# Patient Record
Sex: MALE | Race: WHITE | NOT HISPANIC OR LATINO | Employment: FULL TIME | URBAN - METROPOLITAN AREA
[De-identification: names, ages, dates, MRNs, and addresses within clinical notes are randomized per-mention and may not be internally consistent; named-entity substitution may affect disease eponyms.]

---

## 2017-09-01 ENCOUNTER — HOSPITAL ENCOUNTER (EMERGENCY)
Facility: CLINIC | Age: 47
Discharge: HOME | End: 2017-09-01
Attending: PHYSICIAN ASSISTANT | Admitting: PHYSICIAN ASSISTANT
Payer: OTHER MISCELLANEOUS

## 2017-09-01 ENCOUNTER — APPOINTMENT (OUTPATIENT)
Dept: GENERAL RADIOLOGY | Facility: CLINIC | Age: 47
End: 2017-09-01
Attending: PHYSICIAN ASSISTANT
Payer: OTHER MISCELLANEOUS

## 2017-09-01 VITALS
HEIGHT: 66 IN | BODY MASS INDEX: 37.77 KG/M2 | DIASTOLIC BLOOD PRESSURE: 96 MMHG | HEART RATE: 64 BPM | SYSTOLIC BLOOD PRESSURE: 121 MMHG | RESPIRATION RATE: 16 BRPM | OXYGEN SATURATION: 96 % | TEMPERATURE: 98.4 F | WEIGHT: 235 LBS

## 2017-09-01 DIAGNOSIS — S29.9XXA CHEST WALL INJURY, INITIAL ENCOUNTER: ICD-10-CM

## 2017-09-01 DIAGNOSIS — S70.02XA CONTUSION OF LEFT HIP, INITIAL ENCOUNTER: ICD-10-CM
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
## 2017-09-01 DIAGNOSIS — S39.012A BACK STRAIN, INITIAL ENCOUNTER: ICD-10-CM

## 2017-09-01 LAB
ALBUMIN UR-MCNC: NEGATIVE MG/DL
APPEARANCE UR: CLEAR
BILIRUB UR QL STRIP: NEGATIVE
COLOR UR AUTO: YELLOW
GLUCOSE UR STRIP-MCNC: NEGATIVE MG/DL
HGB UR QL STRIP: ABNORMAL
KETONES UR STRIP-MCNC: ABNORMAL MG/DL
LEUKOCYTE ESTERASE UR QL STRIP: NEGATIVE
MUCOUS THREADS #/AREA URNS LPF: PRESENT /LPF
NITRATE UR QL: NEGATIVE
PH UR STRIP: 5.5 PH (ref 5–7)
RBC #/AREA URNS AUTO: ABNORMAL /HPF
SOURCE: ABNORMAL
SP GR UR STRIP: 1.02 (ref 1–1.03)
UROBILINOGEN UR STRIP-ACNC: 1 EU/DL (ref 0.2–1)
WBC #/AREA URNS AUTO: ABNORMAL /HPF

## 2017-09-01 PROCEDURE — 99285 EMERGENCY DEPT VISIT HI MDM: CPT

## 2017-09-01 PROCEDURE — 71101 X-RAY EXAM UNILAT RIBS/CHEST: CPT | Mod: LT

## 2017-09-01 PROCEDURE — 25000132 ZZH RX MED GY IP 250 OP 250 PS 637: Performed by: PHYSICIAN ASSISTANT

## 2017-09-01 PROCEDURE — 81001 URINALYSIS AUTO W/SCOPE: CPT | Performed by: PHYSICIAN ASSISTANT

## 2017-09-01 PROCEDURE — 73502 X-RAY EXAM HIP UNI 2-3 VIEWS: CPT

## 2017-09-01 RX ORDER — OXYCODONE AND ACETAMINOPHEN 5; 325 MG/1; MG/1
2 TABLET ORAL ONCE
Status: COMPLETED | OUTPATIENT
Start: 2017-09-01 | End: 2017-09-01

## 2017-09-01 RX ORDER — TIZANIDINE 2 MG/1
1-2 TABLET ORAL 3 TIMES DAILY
Qty: 10 TABLET | Refills: 0 | Status: SHIPPED | OUTPATIENT
Start: 2017-09-01 | End: 2017-09-05

## 2017-09-01 RX ORDER — OXYCODONE AND ACETAMINOPHEN 5; 325 MG/1; MG/1
1-2 TABLET ORAL EVERY 4 HOURS PRN
Qty: 15 TABLET | Refills: 0 | Status: SHIPPED | OUTPATIENT
Start: 2017-09-01 | End: 2017-09-05

## 2017-09-01 RX ADMIN — OXYCODONE HYDROCHLORIDE AND ACETAMINOPHEN 2 TABLET: 5; 325 TABLET ORAL at 21:34

## 2017-09-01 ASSESSMENT — ENCOUNTER SYMPTOMS
VOMITING: 0
HEADACHES: 0
NUMBNESS: 0
BACK PAIN: 1
NAUSEA: 0

## 2017-09-01 NOTE — ED AVS SNAPSHOT
Emergency Department    [address] [facility] [address]    Phone:  [phone]    Fax:  [phone]                                       Ry Escobar   MRN: [id number]    Department:   Emergency Department   Date of Visit:  9/1/2017           Patient Information     Date Of Birth          1970        Your diagnoses for this visit were:     Chest wall injury, initial encounter     Back strain, initial encounter     Contusion of left hip, initial encounter     Fall, initial encounter        You were seen by Brooke Farr PA-C.      Follow-up Information     Follow up with [facility] In 3 days.    Specialties:  Podiatry, Internal Medicine, Family Medicine    Contact information:    [address] [address] [phone]        Follow up with  Emergency Department.    Specialty:  EMERGENCY MEDICINE    Why:  If symptoms worsen    Contact information:    [address] [facility] [id number] [phone]        Discharge Instructions       Discharge Instructions  Chest Injury    You have been seen today because of a chest injury.  You may have contusion (bruise) of the chest or a rib fracture (break).  Rib fractures can be hard to see on x-ray, so we can t always be sure whether your rib is broken or bruised. Fortunately, the treatment of these injuries is usually the same, and includes pain control and preventing complications.    Return to the Emergency Department if:    You become short of breath.    You develop a fever over 101.5 degrees.    You pass out or become very weak or pale.    You have abdominal pain that is new or increasing.    You cough up blood.    You have new symptoms or anything that worries you.    Follow-up with your doctor:    As directed by your physician today.    If you are not improved in two weeks.    If you need more pain medicine, since we don t refill pain pills through the Emergency  Department.    Home care instructions:    Chest injuries can be painful.  You may take a pain medication such as Tylenol  (acetaminophen), Advil  (ibuprofen), Nuprin  (ibuprofen) or Aleve  (naproxen).  If you have been given a narcotic such as Vicodin  (hydrocodone with acetaminophen), Percocet  (oxycodone with acetaminophen), or codeine, do not drive for four hours after you have taken it. If the narcotic contains Tylenol  (acetaminophen), do not take Tylenol  with it. All narcotics will cause constipation, so eat a high fiber diet.      Applying ice packs to the painful area can help your pain.     Holding a pillow against your chest can help with pain when you need to move or cough.    You may need to rest and avoid lifting particularly in the first few days after your injury.    Prevention of pneumonia (lung infection) is also a part of managing chest injuries.  Because it can hurt to take deep breaths, you could develop collapsed areas of lung that can develop infection.  To prevent this, you need to take ten very deep breaths every hour while you are awake. Sometimes you will be given a device called an incentive spirometer to help with this. You also need to make yourself cough every hour.    Rib belts or binders are not generally recommended, since they may increase the risk of pneumonia. If you do use one, use it for only short periods of time.   If you were given a prescription for medicine here today, be sure to read all of the information (including the package insert) that comes with your prescription.  This will include important information about the medicine, its side effects, and any warnings that you need to know about.  The pharmacist who fills the prescription can provide more information and answer questions you may have about the medicine.  If you have questions or concerns that the pharmacist cannot address, please call or return to the Emergency Department.       Discharge Instructions  Back  Pain  You were seen today for back pain. Back pain can have many causes, but most will get better without surgery or other specific treatment. Sometimes there is a herniated ( slipped ) disc. We don t usually do MRI scans to look for these right away, since most herniated discs will get better on their own with time.  Today, we did not find any evidence that your back pain was caused by a serious condition, such as an infection, fracture, or tumor. However, sometimes symptoms develop over time and cannot be found during an emergency visit, so it is very important that you follow up with your primary doctor.  Return to the Emergency Department if:    You develop a fever with your back pain.     You have weakness or change in sensation in one or both legs.    You lose control of your bowels or bladder, or can t empty your bladder.    Your pain gets much worse.     Follow-up with your doctor:    Unless your pain has completely gone away, please make an appointment with your doctor within one week.  You may need further management of your back pain, such as more pain medication, imaging such as an X-ray or MRI, or physical therapy.    What can I do to help myself?    Remain Active -- People are often afraid that they will hurt their back further or delay recovery by remaining active, but this is one of the best things you can do for your back. In fact, prolonged bed rest is not recommended. Studies have shown that people with low back pain recover faster when they remain active. Movement helps to bring blood flow to the muscles and relieve muscle spasms as well as preventing loss of muscle strength.    Heat -- Using a heating pad can help with low back pain during the first few weeks. Do not sleep with a heating pad, as you can be burned.     Pain medications - You may take a pain medication such as Tylenol  (acetaminophen), Advil , Nuprin  (ibuprofen) or Aleve  (naproxen).  If you have been given a narcotic such as  Vicodin  (hydrocodone with acetaminophen), Percocet  (oxycodone with acetaminophen), codeine, or a muscle relaxant such as Flexeril  (cyclobenzaprine) or Soma  (carisoprodol), do not drive for four hours after you have taken it. If the narcotic contains Tylenol  (acetaminophen), do not take Tylenol  with it. All narcotics will cause constipation, so eat a high fiber diet.   If you were given a prescription for medicine here today, be sure to read all of the information (including the package insert) that comes with your prescription.  This will include important information about the medicine, its side effects, and any warnings that you need to know about.  The pharmacist who fills the prescription can provide more information and answer questions you may have about the medicine.  If you have questions or concerns that the pharmacist cannot address, please call or return to the Emergency Department.   Opioid Medication Information    Pain medications are among the most commonly prescribed medicines, so we are including this information for all our patients. If you did not receive pain medication or get a prescription for pain medicine, you can ignore it.     You may have been given a prescription for an opioid (narcotic) pain medicine and/or have received a pain medicine while here in the Emergency Department. These medicines can make you drowsy or impaired. You must not drive, operate dangerous equipment, or engage in any other dangerous activities while taking these medications. If you drive while taking these medications, you could be arrested for DUI, or driving under the influence. Do not drink any alcohol while you are taking these medications.     Opioid pain medications can cause addiction. If you have a history of chemical dependency of any type, you are at a higher risk of becoming addicted to pain medications.  Only take these prescribed medications to treat your pain when all other options have been  tried. Take it for as short a time and as few doses as possible. Store your pain pills in a secure place, as they are frequently stolen and provide a dangerous opportunity for children or visitors in your house to start abusing these powerful medications. We will not replace any lost or stolen medicine.  As soon as your pain is better, you should flush all your remaining medication.     Many prescription pain medications contain Tylenol  (acetaminophen), including Vicodin , Tylenol #3 , Norco , Lortab , and Percocet .  You should not take any extra pills of Tylenol  if you are using these prescription medications or you can get very sick.  Do not ever take more than 3000 mg of acetaminophen in any 24 hour period.    All opioids tend to cause constipation. Drink plenty of water and eat foods that have a lot of fiber, such as fruits, vegetables, prune juice, apple juice and high fiber cereal.  Take a laxative if you don t move your bowels at least every other day. Miralax , Milk of Magnesia, Colace , or Senna  can be used to keep you regular.      Remember that you can always come back to the Emergency Department if you are not able to see your regular doctor in the amount of time listed above, if you get any new symptoms, or if there is anything that worries you.        24 Hour Appointment Hotline       To make an appointment at any [facility] clinic, call [phone] ([phone]). If you don't have a family doctor or clinic, we will help you find one. [facility] clinics are conveniently located to serve the needs of you and your family.             Review of your medicines      START taking        Dose / Directions Last dose taken    oxyCODONE-acetaminophen 5-325 MG per tablet   Commonly known as:  PERCOCET   Dose:  1-2 tablet   Quantity:  15 tablet        Take 1-2 tablets by mouth every 4 hours as needed for pain   Refills:  0        tiZANidine 2 MG tablet   Commonly known as:  ZANAFLEX   Dose:  1-2 mg    Quantity:  10 tablet        Take 0.5-1 tablets (1-2 mg) by mouth 3 times daily   Refills:  0          Our records show that you are taking the medicines listed below. If these are incorrect, please call your family doctor or clinic.        Dose / Directions Last dose taken    ibuprofen 600 MG tablet   Commonly known as:  ADVIL/MOTRIN   Dose:  600 mg   Quantity:  50 tablet        Take 1 tablet (600 mg) by mouth every 6 hours as needed for other (For mild pain or temperature greater than 102F)   Refills:  0        PRILOSEC PO        Refills:  0                Prescriptions were sent or printed at these locations (2 Prescriptions)                   Other Prescriptions                Printed at Department/Unit printer (2 of 2)         oxyCODONE-acetaminophen (PERCOCET) 5-325 MG per tablet               tiZANidine (ZANAFLEX) 2 MG tablet                Procedures and tests performed during your visit     *UA reflex to Microscopic    Ribs XR, unilat 3 views + PA chest,  left    Urine Microscopic    XR Pelvis w Hip Left 1 View      Orders Needing Specimen Collection     None      Pending Results     No orders found from 8/30/2017 to 9/2/2017.            Pending Culture Results     No orders found from 8/30/2017 to 9/2/2017.            Pending Results Instructions     If you had any lab results that were not finalized at the time of your Discharge, you can call the ED Lab Result RN at [phone]. You will be contacted by this team for any positive Lab results or changes in treatment. The nurses are available 7 days a week from 10A to 6:30P.  You can leave a message 24 hours per day and they will return your call.        Test Results From Your Hospital Stay        9/1/2017 10:14 PM      Narrative     RIBS UNILATERAL THREE VIEWS LEFT  9/1/2017 10:05 PM    HISTORY: Posterior rib pain after fall.    COMPARISON: None.    FINDINGS: Oblique views of the left hemithorax demonstrate no rib  fractures or pneumothorax. There are no  acute infiltrates. The cardiac  silhouette is not enlarged. Pulmonary vasculature is unremarkable.        Impression     IMPRESSION: No rib fracture demonstrated.    MADI RUBIO MD         9/1/2017 10:14 PM      Narrative     PELVIS WITH UNILATERAL HIP ONE VIEW LEFT  9/1/2017 10:04 PM     HISTORY: Left hip and posterior pelvic pain after a fall.    COMPARISON: None.    FINDINGS: Mild bilateral acetabular sclerosis and loss of joint space.  There is no acute fracture or dislocation. There are no worrisome bony  lesions.        Impression     IMPRESSION: No acute osseous abnormality demonstrated.    MADI RUBIO MD         9/1/2017 10:10 PM      Component Results     Component Value Ref Range & Units Status    Color Urine Yellow  Final    Appearance Urine Clear  Final    Glucose Urine Negative NEG^Negative mg/dL Final    Bilirubin Urine Negative NEG^Negative Final    Ketones Urine Trace (A) NEG^Negative mg/dL Final    Specific Gravity Urine 1.025 1.003 - 1.035 Final    Blood Urine Small (A) NEG^Negative Final    pH Urine 5.5 5.0 - 7.0 pH Final    Protein Albumin Urine Negative NEG^Negative mg/dL Final    Urobilinogen Urine 1.0 0.2 - 1.0 EU/dL Final    Nitrite Urine Negative NEG^Negative Final    Leukocyte Esterase Urine Negative NEG^Negative Final    Source Midstream Urine  Final         9/1/2017 10:10 PM      Component Results     Component Value Ref Range & Units Status    WBC Urine O - 2 OTO2^O - 2 /HPF Final    RBC Urine O - 2 OTO2^O - 2 /HPF Final    Mucous Urine Present (A) NEG^Negative /LPF Final                Clinical Quality Measure: Blood Pressure Screening     Your blood pressure was checked while you were in the emergency department today. The last reading we obtained was  BP: (!) 121/96 . Please read the guidelines below about what these numbers mean and what you should do about them.  If your systolic blood pressure (the top number) is less than 120 and your diastolic blood pressure (the bottom  "number) is less than 80, then your blood pressure is normal. There is nothing more that you need to do about it.  If your systolic blood pressure (the top number) is 120-139 or your diastolic blood pressure (the bottom number) is 80-89, your blood pressure may be higher than it should be. You should have your blood pressure rechecked within a year by a primary care provider.  If your systolic blood pressure (the top number) is 140 or greater or your diastolic blood pressure (the bottom number) is 90 or greater, you may have high blood pressure. High blood pressure is treatable, but if left untreated over time it can put you at risk for heart attack, stroke, or kidney failure. You should have your blood pressure rechecked by a primary care provider within the next 4 weeks.  If your provider in the emergency department today gave you specific instructions to follow-up with your doctor or provider even sooner than that, you should follow that instruction and not wait for up to 4 weeks for your follow-up visit.        Thank you for choosing [facility]       Thank you for choosing [facility] for your care. Our goal is always to provide you with excellent care. Hearing back from our patients is one way we can continue to improve our services. Please take a few minutes to complete the written survey that you may receive in the mail after you visit with us. Thank you!        Potential Information     Potential lets you send messages to your doctor, view your test results, renew your prescriptions, schedule appointments and more. To sign up, go to [email or web address] . Click on \"Log in\" on the left side of the screen, which will take you to the Welcome page. Then click on \"Sign up Now\" on the right side of the page.     You will be asked to enter the access code listed below, as well as some personal information. Please follow the directions to create your username and password.     Your access code is: [email or web address]  Expires: " 2017 10:44 PM     Your access code will  in 90 days. If you need help or a new code, please call your [facility] clinic or [phone].        Care EveryWhere ID     This is your Care EveryWhere ID. This could be used by other organizations to access your [facility] medical records  [id number]        Equal Access to Services     LUDY MARIE : Marga Virgen, wacorrine porter, sheila meadaldanilo carranza, cosmo merino . [facility] [phone].    ATENCIÓN: Si habla español, tiene a llamas disposición servicios gratuitos de asistencia lingüística. Llame al [phone].    We comply with applicable federal civil rights laws and [place] laws. We do not discriminate on the basis of race, color, national origin, age, disability sex, sexual orientation or gender identity.            After Visit Summary       This is your record. Keep this with you and show to your community pharmacist(s) and doctor(s) at your next visit.

## 2017-09-01 NOTE — ED AVS SNAPSHOT
Emergency Department    [address] [place] [address]    Phone:  [phone]    Fax:  [phone]                                       Ry Escobar   MRN: [id number]    Department:   Emergency Department   Date of Visit:  9/1/2017           After Visit Summary Signature Page     I have received my discharge instructions, and my questions have been answered. I have discussed any challenges I see with this plan with the nurse or doctor.    ..........................................................................................................................................  Patient/Patient Representative Signature      ..........................................................................................................................................  Patient Representative Print Name and Relationship to Patient    ..................................................               ................................................  Date                                            Time    ..........................................................................................................................................  Reviewed by Signature/Title    ...................................................              ..............................................  Date                                                            Time

## 2017-09-02 ASSESSMENT — ENCOUNTER SYMPTOMS
ABDOMINAL PAIN: 0
COUGH: 0
SHORTNESS OF BREATH: 0
WOUND: 0
WEAKNESS: 0
NECK PAIN: 0

## 2017-09-02 NOTE — ED PROVIDER NOTES
"  History     Chief Complaint:  Fall    HPI   Ry Escobar is a 47 year old male who presents after a fall. Just prior to arrival the patient was at work on a machine, when he tripped and fell 2-3 feet onto a metal rung on another part of the machine. He did not hit his head or lose consciousness. He was also wearing his hard hat. Since the fall he has had left posterior rib pain, left lower back pain and left hip pain. The pains worsen with movement and the chest wall pain worsens with deep breathing. He has been ambulatory since. He has not taken any pain medication. Patient denies headache, nausea and vomiting, numbness and tingling in the lower extremities, bowel or bladder incontinence, hematuria, abdominal pain, saddle anesthesia, any other areas of pain or injury, or any other symptoms or concerns.     Allergies:  Robitussin     Medications:    Prilosec  Advil     Past Medical History:    Closed lumbar fracture  GERD  Kidney stone    Past Surgical History:    History reviewed. No pertinent surgical history.    Family History:    History reviewed. No pertinent family history.    Social History:  Marital Status:   Presents to the ED with a friend.   Tobacco Use: Never  Alcohol Use: Rarely  PCP: Physician No Ref-Primary     Review of Systems   Respiratory: Negative for cough and shortness of breath.    Cardiovascular: Positive for chest pain.   Gastrointestinal: Negative for abdominal pain, nausea and vomiting.   Musculoskeletal: Positive for back pain. Negative for neck pain.        + left hip pain  - other areas of pain   Skin: Negative for wound.   Neurological: Negative for syncope, weakness, numbness and headaches.   All other systems reviewed and are negative.      Physical Exam   First Vitals:  BP: 133/67  Pulse: 59  Temp: 98.4  F (36.9  C)  Resp: 16  Height: 167.6 cm (5' 6\")  Weight: 106.6 kg (235 lb)  SpO2: 96 %      Physical Exam  General: Resting comfortably on the gurney.    Head:  The scalp, " head and face appear normal. No evidence of trauma.     No scalp hematoma or step-offs.     No racoon eyes or battel signs.   ENT:  Pupils are equal, round and reactive to light. EOM intact. No nystagmus.    Oropharynx is moist.    Neck:  Supple, no rigidity noted. Normal ROM.     Trachea midline. No mass detected.      No cervical midline or paraspinal muscle tenderness. No step-offs.   Resp:  Non-labored breathing. No tachypnea.     Lung fields clear to auscultation without wheezes or rales.   CV:  Regular rate and rhythm. Normal S1 and S2, no S3 or S4.     No pathological murmur detected.     Radial and PT pulses intact and symmetric.   GI:  Abdomen is soft and non-distended.     Non-tender to palpation in all four quadrants.      No CVA tenderness bilaterally.   MS:  Normal muscular tone.     5/5 and symmetric strength with dorsi- and plantar-flexion, , elbow flexion and extension.     Normal and symmetric ROM with dorsi- and plantar-flexion, knee flexion and extension, elbow flexion and extension.     Left lower, posterior, rib cage tenderness with palpation, no step-offs or crepitus. The remainder of the chest wall is non-tender to palpation.      Left mid to lower lumbar paraspinal muscle tenderness and tightness with palpation. No right sided or midline tenderness. No step-offs.     No thoracic midline or paraspinal muscle tenderness with palpation. No step-offs.     Left posterior and lateral hip as well as pelvic pain with palpation with posterior-lateral faint ecchymosis. The remainder of the left lower extremity is non-tender with palpation. Able to range the hip with only mild pain.     Pelvis stable to compression.   Neuro:  GCS 15.     Awake and alert. Obeys commands appropriately.     Speech is clear.     Patellar and achilles reflexes 2+ and symmetric.     Sensation intact to light touch upper and lower extremities.   Skin:  As in MS, otherwise no rash, ecchymosis, abrasions or lacerations.    Psych: Normal affect. Appropriate interactions.      Emergency Department Course     Imaging:  Left Ribs XR, per radiology:   IMPRESSION: No rib fracture demonstrated.    Pelvis with left Hip XR, per radiology:   IMPRESSION: No acute osseous abnormality demonstrated.    Radiographic findings were communicated with the patient who voiced understanding of the findings.    Laboratory:  UA: Clear yellow urine, trace ketones, small blood, otherwise WNL  UA, Microscopic: WBC 0-2, RBC 0-2, mucous present     Interventions:  2134: Percocet, 2 tablets, oral    Emergency Department Course:  Nursing notes and vitals reviewed.  I performed an exam of the patient as documented above.  The above workup was undertaken.  2224: I rechecked the patient and discussed results.  Findings and plan explained to the Patient. Patient discharged home, status improved, with instructions regarding supportive care, medications, and reasons to return as well as the importance of close follow-up was reviewed. Patient was prescribed Percocet and Zanaflex.     Impression & Plan      Medical Decision Making:  Mr. Escobar is a 47 year old male who presents to the ED for evaluation after a fall this evening. He fell on to a metal bar on to his left posterior chest wall back and hip. He did not hit his head or lose consciousness.     Regarding the chest wall, concern was for rib fracture, pneumothorax, among others. XR is negative for signs of fracture, pneumothorax or any acute abnormality.  The sensitivity for rib fracture on chest xray was discussed with patient and if symptoms continue or progress may need CT of chest; I do not feel with the patients risk/benefit ratio and clinical symptoms this is required at this time.    We discussed making sure he is taking deep breaths as he is at increased risk of pneumonia with shallow breathing and following up with PCP closely.     Regarding the back pain, there are no signs of cord compression or vascular  compromise.  No midline tenderness, therefore x-rays are not necessary due to the low likelihood of fracture or subluxation. He  has not had a fever, saddle/perineal anesthesia, bilateral foot numbness, or bowel or bladder dysfunction.  There is no clinical evidence of cauda equina syndrome, discitis, spinal/epidural space hematoma or abscess. The neurological exam is normal and his symptoms are consistent with a musculoskeletal strain. There is no current evidence of radiculopathy or myelopathy. The patient will be discharged with pain medications to use as directed.      He also presents with hip contusion. XRs are negative for fracture, dislocation or any other acute abnormality. No signs of nerve impingement or vascular compromise. He has no numbness or tingling. CMS is intact distally. Recommended ice to the area.     He was given Percocet here with good relief of symptoms. He does have significant muscle tightness in his back and for that he was also given tizanidine since he has had a bad reaction to Flexeril in the past, but has tolerated tizanidine. Narcotic precautions were given with Percocet. No other areas of pain or injury. Given the back and hip location of the pain, urine analysis was obtained showing 0-2 RBCs, no significant hematuria to show kidney injury. Additionally he has no pain with multiple abdominal and CVA exams and no rigidity. I do not believe he needs any imaging or further evaluation for intraabdominal process at this time. He will follow up with primary care in 3 days for recheck or return to ED for worsening or new symptoms. I discussed the results, plan and any additional questions with the patient. He verbalized understanding and agreement with the plan.          Diagnosis:    ICD-10-CM    1. Chest wall injury, initial encounter S29.9XXA    2. Back strain, initial encounter S39.012A    3. Contusion of left hip, initial encounter S70.02XA    4. Fall, initial encounter W19.XXXA         Disposition:  Discharged to home.     Discharge Medications:  New Prescriptions    OXYCODONE-ACETAMINOPHEN (PERCOCET) 5-325 MG PER TABLET    Take 1-2 tablets by mouth every 4 hours as needed for pain    TIZANIDINE (ZANAFLEX) 2 MG TABLET    Take 0.5-1 tablets (1-2 mg) by mouth 3 times daily         Jolly DEL RIO, am serving as a scribe on 9/1/2017 at 9:22 PM to personally document services performed by Brooke Farr PA-C, based on my observations and the provider's statements to me.    EMERGENCY DEPARTMENT       Brooke Farr PA-C  09/02/17 1927

## 2017-09-02 NOTE — DISCHARGE INSTRUCTIONS
Discharge Instructions  Chest Injury    You have been seen today because of a chest injury.  You may have contusion (bruise) of the chest or a rib fracture (break).  Rib fractures can be hard to see on x-ray, so we can t always be sure whether your rib is broken or bruised. Fortunately, the treatment of these injuries is usually the same, and includes pain control and preventing complications.    Return to the Emergency Department if:    You become short of breath.    You develop a fever over 101.5 degrees.    You pass out or become very weak or pale.    You have abdominal pain that is new or increasing.    You cough up blood.    You have new symptoms or anything that worries you.    Follow-up with your doctor:    As directed by your physician today.    If you are not improved in two weeks.    If you need more pain medicine, since we don t refill pain pills through the Emergency Department.    Home care instructions:    Chest injuries can be painful.  You may take a pain medication such as Tylenol  (acetaminophen), Advil  (ibuprofen), Nuprin  (ibuprofen) or Aleve  (naproxen).  If you have been given a narcotic such as Vicodin  (hydrocodone with acetaminophen), Percocet  (oxycodone with acetaminophen), or codeine, do not drive for four hours after you have taken it. If the narcotic contains Tylenol  (acetaminophen), do not take Tylenol  with it. All narcotics will cause constipation, so eat a high fiber diet.      Applying ice packs to the painful area can help your pain.     Holding a pillow against your chest can help with pain when you need to move or cough.    You may need to rest and avoid lifting particularly in the first few days after your injury.    Prevention of pneumonia (lung infection) is also a part of managing chest injuries.  Because it can hurt to take deep breaths, you could develop collapsed areas of lung that can develop infection.  To prevent this, you need to take ten very deep breaths every  hour while you are awake. Sometimes you will be given a device called an incentive spirometer to help with this. You also need to make yourself cough every hour.    Rib belts or binders are not generally recommended, since they may increase the risk of pneumonia. If you do use one, use it for only short periods of time.   If you were given a prescription for medicine here today, be sure to read all of the information (including the package insert) that comes with your prescription.  This will include important information about the medicine, its side effects, and any warnings that you need to know about.  The pharmacist who fills the prescription can provide more information and answer questions you may have about the medicine.  If you have questions or concerns that the pharmacist cannot address, please call or return to the Emergency Department.       Discharge Instructions  Back Pain  You were seen today for back pain. Back pain can have many causes, but most will get better without surgery or other specific treatment. Sometimes there is a herniated ( slipped ) disc. We don t usually do MRI scans to look for these right away, since most herniated discs will get better on their own with time.  Today, we did not find any evidence that your back pain was caused by a serious condition, such as an infection, fracture, or tumor. However, sometimes symptoms develop over time and cannot be found during an emergency visit, so it is very important that you follow up with your primary doctor.  Return to the Emergency Department if:    You develop a fever with your back pain.     You have weakness or change in sensation in one or both legs.    You lose control of your bowels or bladder, or can t empty your bladder.    Your pain gets much worse.     Follow-up with your doctor:    Unless your pain has completely gone away, please make an appointment with your doctor within one week.  You may need further management of your  back pain, such as more pain medication, imaging such as an X-ray or MRI, or physical therapy.    What can I do to help myself?    Remain Active -- People are often afraid that they will hurt their back further or delay recovery by remaining active, but this is one of the best things you can do for your back. In fact, prolonged bed rest is not recommended. Studies have shown that people with low back pain recover faster when they remain active. Movement helps to bring blood flow to the muscles and relieve muscle spasms as well as preventing loss of muscle strength.    Heat -- Using a heating pad can help with low back pain during the first few weeks. Do not sleep with a heating pad, as you can be burned.     Pain medications - You may take a pain medication such as Tylenol  (acetaminophen), Advil , Nuprin  (ibuprofen) or Aleve  (naproxen).  If you have been given a narcotic such as Vicodin  (hydrocodone with acetaminophen), Percocet  (oxycodone with acetaminophen), codeine, or a muscle relaxant such as Flexeril  (cyclobenzaprine) or Soma  (carisoprodol), do not drive for four hours after you have taken it. If the narcotic contains Tylenol  (acetaminophen), do not take Tylenol  with it. All narcotics will cause constipation, so eat a high fiber diet.   If you were given a prescription for medicine here today, be sure to read all of the information (including the package insert) that comes with your prescription.  This will include important information about the medicine, its side effects, and any warnings that you need to know about.  The pharmacist who fills the prescription can provide more information and answer questions you may have about the medicine.  If you have questions or concerns that the pharmacist cannot address, please call or return to the Emergency Department.   Opioid Medication Information    Pain medications are among the most commonly prescribed medicines, so we are including this information  for all our patients. If you did not receive pain medication or get a prescription for pain medicine, you can ignore it.     You may have been given a prescription for an opioid (narcotic) pain medicine and/or have received a pain medicine while here in the Emergency Department. These medicines can make you drowsy or impaired. You must not drive, operate dangerous equipment, or engage in any other dangerous activities while taking these medications. If you drive while taking these medications, you could be arrested for DUI, or driving under the influence. Do not drink any alcohol while you are taking these medications.     Opioid pain medications can cause addiction. If you have a history of chemical dependency of any type, you are at a higher risk of becoming addicted to pain medications.  Only take these prescribed medications to treat your pain when all other options have been tried. Take it for as short a time and as few doses as possible. Store your pain pills in a secure place, as they are frequently stolen and provide a dangerous opportunity for children or visitors in your house to start abusing these powerful medications. We will not replace any lost or stolen medicine.  As soon as your pain is better, you should flush all your remaining medication.     Many prescription pain medications contain Tylenol  (acetaminophen), including Vicodin , Tylenol #3 , Norco , Lortab , and Percocet .  You should not take any extra pills of Tylenol  if you are using these prescription medications or you can get very sick.  Do not ever take more than 3000 mg of acetaminophen in any 24 hour period.    All opioids tend to cause constipation. Drink plenty of water and eat foods that have a lot of fiber, such as fruits, vegetables, prune juice, apple juice and high fiber cereal.  Take a laxative if you don t move your bowels at least every other day. Miralax , Milk of Magnesia, Colace , or Senna  can be used to keep you regular.       Remember that you can always come back to the Emergency Department if you are not able to see your regular doctor in the amount of time listed above, if you get any new symptoms, or if there is anything that worries you.

## 2017-09-05 ENCOUNTER — OFFICE VISIT (OUTPATIENT)
Dept: FAMILY MEDICINE | Facility: CLINIC | Age: 47
End: 2017-09-05
Payer: OTHER MISCELLANEOUS

## 2017-09-05 VITALS
SYSTOLIC BLOOD PRESSURE: 126 MMHG | TEMPERATURE: 97.7 F | HEART RATE: 57 BPM | OXYGEN SATURATION: 97 % | HEIGHT: 66 IN | WEIGHT: 238 LBS | RESPIRATION RATE: 14 BRPM | DIASTOLIC BLOOD PRESSURE: 81 MMHG | BODY MASS INDEX: 38.25 KG/M2

## 2017-09-05 DIAGNOSIS — S20.212D: Primary | ICD-10-CM

## 2017-09-05 PROCEDURE — 99213 OFFICE O/P EST LOW 20 MIN: CPT | Performed by: FAMILY MEDICINE

## 2017-09-05 RX ORDER — TIZANIDINE 2 MG/1
1-2 TABLET ORAL 3 TIMES DAILY
Qty: 30 TABLET | Refills: 0 | Status: SHIPPED | OUTPATIENT
Start: 2017-09-05 | End: 2017-09-27

## 2017-09-05 RX ORDER — OXYCODONE AND ACETAMINOPHEN 5; 325 MG/1; MG/1
1-2 TABLET ORAL EVERY 4 HOURS PRN
Qty: 30 TABLET | Refills: 0 | Status: SHIPPED | OUTPATIENT
Start: 2017-09-05 | End: 2018-10-11

## 2017-09-05 NOTE — LETTER
[facility]  [address] [address] [address]  [phone]          September 5, 2017    Ry Escobar                                                                                                                     [address] [facility] [address]        To Whom It May Concern:    Please excuse Ry Escobar from work today 9/5/17 until 9/9/17 due to multiple rib and back contusions. He has a follow up appointment on 9/9/17 and further restrictions will be advised at the appointment.       Sincerely,         Gelacio Dumas MD

## 2017-09-05 NOTE — MR AVS SNAPSHOT
"              After Visit Summary   9/5/2017    Ry Escobar    MRN: [id number]           Patient Information     Date Of Birth          1970        Visit Information        Provider Department      9/5/2017 3:00 PM Gelacio Dumas MD [facility]        Today's Diagnoses     Bruised ribs, left, subsequent encounter    -  1       Follow-ups after your visit        Your next 10 appointments already scheduled     Sep 08, 2017  3:30 PM CDT   Office Visit with Gelacio Dumas MD   [facility] ([facility])    [address] [place] [id number] [phone]           Bring a current list of meds and any records pertaining to this visit. For Physicals, please bring immunization records and any forms needing to be filled out. Please arrive 10 minutes early to complete paperwork.              Who to contact     If you have questions or need follow up information about today's clinic visit or your schedule please contact [facility] directly at [phone].  Normal or non-critical lab and imaging results will be communicated to you by [employer], letter or phone within 4 business days after the clinic has received the results. If you do not hear from us within 7 days, please contact the clinic through [employer] or phone. If you have a critical or abnormal lab result, we will notify you by phone as soon as possible.  Submit refill requests through [employer] or call your pharmacy and they will forward the refill request to us. Please allow 3 business days for your refill to be completed.          Additional Information About Your Visit        [employer] Workout Information     [employer] lets you send messages to your doctor, view your test results, renew your prescriptions, schedule appointments and more. To sign up, go to [email or web address] . Click on \"Log in\" on the left side of the screen, which will take you to the Welcome " "page. Then click on \"Sign up Now\" on the right side of the page.     You will be asked to enter the access code listed below, as well as some personal information. Please follow the directions to create your username and password.     Your access code is: [email or web address]  Expires: 2017 10:44 PM     Your access code will  in 90 days. If you need help or a new code, please call your [facility] clinic or [phone].        Care EveryWhere ID     This is your Care EveryWhere ID. This could be used by other organizations to access your [facility] medical records  [id number]        Your Vitals Were     Pulse Temperature Respirations Height Pulse Oximetry BMI (Body Mass Index)    57 97.7  F (36.5  C) (Oral) 14 5' 6\" (1.676 m) 97% 38.41 kg/m2       Blood Pressure from Last 3 Encounters:   17 126/81   17 (!) 121/96   13 135/63    Weight from Last 3 Encounters:   17 238 lb (108 kg)   17 235 lb (106.6 kg)   13 220 lb (99.8 kg)              Today, you had the following     No orders found for display         Where to get your medicines      Some of these will need a paper prescription and others can be bought over the counter.  Ask your nurse if you have questions.     Bring a paper prescription for each of these medications     oxyCODONE-acetaminophen 5-325 MG per tablet    tiZANidine 2 MG tablet          Primary Care Provider    Physician No Ref-Primary       No address on file        Equal Access to Services     [facility] FRANK : Hadii khushi loyola Soignacio, waaxda luqadaha, qaybta kaalmada adealba, cosmo merino . So [facility] [phone].    ATENCIÓN: Si habla español, tiene a llamas disposición servicios gratuitos de asistencia lingüística. Llame al [phone].    We comply with applicable federal civil rights laws and [place] laws. We do not discriminate on the basis of race, color, national origin, age, disability sex, sexual orientation or gender " identity.            Thank you!     Thank you for choosing [facility]  for your care. Our goal is always to provide you with excellent care. Hearing back from our patients is one way we can continue to improve our services. Please take a few minutes to complete the written survey that you may receive in the mail after your visit with us. Thank you!             Your Updated Medication List - Protect others around you: Learn how to safely use, store and throw away your medicines at www.disposemymeds.org.          This list is accurate as of: 9/5/17  9:31 PM.  Always use your most recent med list.                   Brand Name Dispense Instructions for use Diagnosis    ibuprofen 600 MG tablet    ADVIL/MOTRIN    50 tablet    Take 1 tablet (600 mg) by mouth every 6 hours as needed for other (For mild pain or temperature greater than 102F)    Multiple transverse process fractures       oxyCODONE-acetaminophen 5-325 MG per tablet    PERCOCET    30 tablet    Take 1-2 tablets by mouth every 4 hours as needed for pain    Bruised ribs, left, subsequent encounter       PRILOSEC PO           tiZANidine 2 MG tablet    ZANAFLEX    30 tablet    Take 0.5-1 tablets (1-2 mg) by mouth 3 times daily    Bruised ribs, left, subsequent encounter

## 2017-09-05 NOTE — LETTER
[facility]  [address] [place], [address]  [phone]        September 5, 2017    Ry Escobar                                                                                                                                                       [address] [address]      Seen here today for injury 9/1/2017 with multiple rib and back contusions. Follow up exam 9/9/2017          Gelacio Dumas MD

## 2017-09-05 NOTE — NURSING NOTE
"Chief Complaint   Patient presents with     Hospital F/U       Initial /81 (BP Location: Left arm, Patient Position: Chair, Cuff Size: Adult Large)  Pulse 57  Temp 97.7  F (36.5  C) (Oral)  Resp 14  Ht 5' 6\" (1.676 m)  Wt 238 lb (108 kg)  SpO2 97%  BMI 38.41 kg/m2 Estimated body mass index is 38.41 kg/(m^2) as calculated from the following:    Height as of this encounter: 5' 6\" (1.676 m).    Weight as of this encounter: 238 lb (108 kg).  Medication Reconciliation: complete   Gato Jha CMA       "

## 2017-09-08 ENCOUNTER — TELEPHONE (OUTPATIENT)
Dept: FAMILY MEDICINE | Facility: CLINIC | Age: 47
End: 2017-09-08

## 2017-09-08 ENCOUNTER — OFFICE VISIT (OUTPATIENT)
Dept: FAMILY MEDICINE | Facility: CLINIC | Age: 47
End: 2017-09-08
Payer: OTHER MISCELLANEOUS

## 2017-09-08 VITALS
HEIGHT: 66 IN | DIASTOLIC BLOOD PRESSURE: 85 MMHG | OXYGEN SATURATION: 95 % | BODY MASS INDEX: 38.8 KG/M2 | RESPIRATION RATE: 12 BRPM | SYSTOLIC BLOOD PRESSURE: 123 MMHG | HEART RATE: 56 BPM | WEIGHT: 241.4 LBS | TEMPERATURE: 97.8 F

## 2017-09-08 DIAGNOSIS — S30.0XXD LUMBAR CONTUSION, SUBSEQUENT ENCOUNTER: Primary | ICD-10-CM

## 2017-09-08 PROCEDURE — 99213 OFFICE O/P EST LOW 20 MIN: CPT | Performed by: FAMILY MEDICINE

## 2017-09-08 NOTE — TELEPHONE ENCOUNTER
"Pt's employer calls about work restrictions letter.   Letter is specific.   Asks \"Why hours restricted because we work 10 hour days?\"   Informed per letter due to back injury.   Employer replied \"well, he doesn't have back injury, he has contusions.\"  We reviewed again the reason for restrictions are noted in the letter, back injury.   Ramon Salinas RN     "

## 2017-09-08 NOTE — PROGRESS NOTES
SUBJECTIVE:   Ry Escobar is a 47 year old male who presents to clinic today for the following health issues:      Patient is here for a back pain and workers compensation follow up.  He states that there is improvement.    Fell against a steel machine, left lower back and hip limit him GREATLY:     He can do light duty just sitting and I prescribed restrictions based on his abilities and improving  Pain profile   Back Subjective:         Symptoms began:   10 day(s) ago       Symptoms changing:  onset acute and onset with this fall  and are moderately improved, .                  Location:  low back left region       Radiation to radiates into the left buttocks and radiates into the left leg       At worst a 7 on a scale of 1-10.         Personal hx of back pain is:  recurrent self limited episodes of low back pain in the past.       Pain is exacerbated by: walking, sitting and changing position.       Pain is relieved by: ice and rest.       Associated sx include: none.       Previous plain films obtained: Yes.        Results: neg.       Red flag symptoms: negative    .(S30.0XXD) Lumbar contusion, subsequent encounter  (primary encounter diagnosis)  Comment:   Plan: had old prior lumbar trauma with fracture that limites mobility   Slow progress, wants to try light duty, hard to say if this will retard progress but it sounds like he can do it safely     Two week follow up

## 2017-09-08 NOTE — MR AVS SNAPSHOT
"              After Visit Summary   9/8/2017    Ry Escobar    MRN: [id number]           Patient Information     Date Of Birth          1970        Visit Information        Provider Department      9/8/2017 3:30 PM Gelacio Dumas MD [facility]        Today's Diagnoses     Lumbar contusion, subsequent encounter    -  1       Follow-ups after your visit        Who to contact     If you have questions or need follow up information about today's clinic visit or your schedule please contact [facility] directly at [phone].  Normal or non-critical lab and imaging results will be communicated to you by Italia Onlinehart, letter or phone within 4 business days after the clinic has received the results. If you do not hear from us within 7 days, please contact the clinic through Plananat or phone. If you have a critical or abnormal lab result, we will notify you by phone as soon as possible.  Submit refill requests through Apigee or call your pharmacy and they will forward the refill request to us. Please allow 3 business days for your refill to be completed.          Additional Information About Your Visit        MyChart Information     Apigee gives you secure access to your electronic health record. If you see a primary care provider, you can also send messages to your care team and make appointments. If you have questions, please call your primary care clinic.  If you do not have a primary care provider, please call [phone] and they will assist you.        Care EveryWhere ID     This is your Care EveryWhere ID. This could be used by other organizations to access your [facility] medical records  [id number]        Your Vitals Were     Pulse Temperature Respirations Height Pulse Oximetry BMI (Body Mass Index)    56 97.8  F (36.6  C) (Oral) 12 5' 6\" (1.676 m) 95% 38.96 kg/m2       Blood Pressure from Last 3 Encounters:   09/08/17 123/85   09/05/17 126/81   09/01/17 (!) " 121/96    Weight from Last 3 Encounters:   09/08/17 241 lb 6.4 oz (109.5 kg)   09/05/17 238 lb (108 kg)   09/01/17 235 lb (106.6 kg)              Today, you had the following     No orders found for display       Primary Care Provider    Physician No Ref-Primary       No address on file        Equal Access to Services     BALWINDER FRANK : Hadii aad ku hadnikoo Soeliudali, waaxda luqadaha, qaybta kaalmada walteralba, waxnav erlin maytesil del real kathleenchandu merino . So [facility] [phone].    ATENCIÓN: Si habla español, tiene a llamas disposición servicios gratuitos de asistencia lingüística. Alivia al [phone].    We comply with applicable federal civil rights laws and [place] laws. We do not discriminate on the basis of race, color, national origin, age, disability sex, sexual orientation or gender identity.            Thank you!     Thank you for choosing [facility]  for your care. Our goal is always to provide you with excellent care. Hearing back from our patients is one way we can continue to improve our services. Please take a few minutes to complete the written survey that you may receive in the mail after your visit with us. Thank you!             Your Updated Medication List - Protect others around you: Learn how to safely use, store and throw away your medicines at www.disposemymeds.org.          This list is accurate as of: 9/8/17 11:59 PM.  Always use your most recent med list.                   Brand Name Dispense Instructions for use Diagnosis    ibuprofen 600 MG tablet    ADVIL/MOTRIN    50 tablet    Take 1 tablet (600 mg) by mouth every 6 hours as needed for other (For mild pain or temperature greater than 102F)    Multiple transverse process fractures       oxyCODONE-acetaminophen 5-325 MG per tablet    PERCOCET    30 tablet    Take 1-2 tablets by mouth every 4 hours as needed for pain    Bruised ribs, left, subsequent encounter       PRILOSEC PO           tiZANidine 2 MG tablet    ZANAFLEX     30 tablet    Take 0.5-1 tablets (1-2 mg) by mouth 3 times daily    Bruised ribs, left, subsequent encounter

## 2017-09-08 NOTE — NURSING NOTE
"Chief Complaint   Patient presents with     Back Pain     follow up       Initial /85 (BP Location: Left arm, Patient Position: Chair, Cuff Size: Adult Large)  Pulse 56  Temp 97.8  F (36.6  C) (Oral)  Resp 12  Ht 5' 6\" (1.676 m)  Wt 241 lb 6.4 oz (109.5 kg)  SpO2 95%  BMI 38.96 kg/m2 Estimated body mass index is 38.96 kg/(m^2) as calculated from the following:    Height as of this encounter: 5' 6\" (1.676 m).    Weight as of this encounter: 241 lb 6.4 oz (109.5 kg).  Medication Reconciliation: complete   Gato Jha CMA       "

## 2017-09-08 NOTE — LETTER
[facility]  [address] [place], [address]  [phone]        September 8, 2017    Ry Escobar                                                                                                                                                       [address] [facility] [address]    OK to return to restricted duty after back injury.  Initial restrictions through 9/21/2017.  No lifting over 15 pounds   Minimal climbing up and down restrict to 3 times in an hour.  Restrict hours to 6 hours per day sept 11 through Sept 13 then 8 hours per day until 9/20/ /2017.           Gelacio Dumas MD

## 2017-09-13 NOTE — TELEPHONE ENCOUNTER
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

## 2017-09-14 NOTE — TELEPHONE ENCOUNTER
Patient called to see if we have a new letter for him? I advised him that the letter was not available yet.  I advised patient that we would contact him when the letter was available for .    Stephania Pham/

## 2017-09-15 NOTE — TELEPHONE ENCOUNTER
"Pt. Checking on status of letter.    SH, please advise if you are able to lift the work restriction hours  \"to no restrictions\". ( I know you have not seen him before and I have explained this to the patient).    He has a follow up on Monday with Dr. Patel. He stated he went to work Monday, Tuesday working a full shift of 10 hours. He went to work on Wednesday and they restricted him to on 6 hours (per Dr. Dumas's previous letter).    Please update letter if appropriate.     [facility].     Nata THACKER, RN, BSN, PHN  [place] Flex RN          "

## 2017-09-15 NOTE — TELEPHONE ENCOUNTER
He will need to wait until Monday, I can not change his work restrictions until an assessment is completed.  Susan Haase, CNP

## 2017-09-18 ENCOUNTER — OFFICE VISIT (OUTPATIENT)
Dept: FAMILY MEDICINE | Facility: CLINIC | Age: 47
End: 2017-09-18
Payer: OTHER MISCELLANEOUS

## 2017-09-18 VITALS
DIASTOLIC BLOOD PRESSURE: 88 MMHG | WEIGHT: 242 LBS | SYSTOLIC BLOOD PRESSURE: 128 MMHG | BODY MASS INDEX: 39.06 KG/M2 | RESPIRATION RATE: 12 BRPM | TEMPERATURE: 98.2 F | HEART RATE: 60 BPM

## 2017-09-18 DIAGNOSIS — S20.212D: Primary | ICD-10-CM

## 2017-09-18 DIAGNOSIS — S30.0XXD LUMBAR CONTUSION, SUBSEQUENT ENCOUNTER: ICD-10-CM

## 2017-09-18 PROCEDURE — 99214 OFFICE O/P EST MOD 30 MIN: CPT | Performed by: FAMILY MEDICINE

## 2017-09-18 RX ORDER — IBUPROFEN 800 MG/1
800 TABLET, FILM COATED ORAL EVERY 8 HOURS PRN
Qty: 60 TABLET | Refills: 1 | Status: SHIPPED | OUTPATIENT
Start: 2017-09-18

## 2017-09-18 NOTE — MR AVS SNAPSHOT
After Visit Summary   9/18/2017    Ry Escobar    MRN: [id number]           Patient Information     Date Of Birth          1970        Visit Information        Provider Department      9/18/2017 1:00 PM Susie Berman MD [facility]        Today's Diagnoses     Bruised ribs, left, subsequent encounter    -  1    Lumbar contusion, subsequent encounter          Care Instructions        Ibuprofen 800 mg every 8 hrs   Can use tylenol extra strength in between as needed  Wear abdominal binder for work   Recommend icing at least 2-3 times a day   Rib Contusion     A rib contusion is a bruise to one or more rib bones. It may cause pain, tenderness, swelling and a purplish discoloration. There may be a sharp pain while breathing.  You will be assessed for other injuries. You will likely be given pain medicine. Rib contusions heal on their own, without further treatment. However, pain may take weeks to months to go away.   Note that a small crack (fracture) in the rib may cause the same symptoms as a rib contusion. The small crack may not be seen on a chest X-ray. However, the conditions are managed in the same way.  Home care    Rest. Avoid heavy lifting, strenuous exertion, or any activity that causes pain.    Ice the area to reduce pain and swelling. Put ice cubes in a plastic bag or use a cold pack. (Wrap the cold source in a thin towel. Do not place it directly on your skin.) Ice the injured area for 20 minutes every 1 to 2 hours the first day. Continue with ice packs 3 to 4 times a day for the next 2 days, then as needed for the relief of pain and swelling.    Take any prescribed pain medicine as directed by your healthcare provider. If none was prescribed, take acetaminophen, ibuprofen, or naproxen to control pain.    If you have a significant injury, you may be given a device called an incentive spirometer to keep your lungs healthy. Use as directed.  Follow-up  care  Follow up with your healthcare provider during the next week or as directed.  When to seek medical advice  Call your healthcare provider for any of the following:    Shortness of breath or trouble breathing    Increasing chest pain with breathing    Coughing    Dizziness, weakness, or fainting    New or worsening pain    Fever of 100.4 F (38 C) or higher, or as directed by your healthcare provider  Date Last Reviewed: 2/1/2017 2000-2017 [employer]. [address] [place] [address], [place] [address]. All rights reserved. This information is not intended as a substitute for professional medical care. Always follow your healthcare professional's instructions.                Follow-ups after your visit        Who to contact     If you have questions or need follow up information about today's clinic visit or your schedule please contact [facility] directly at [phone].  Normal or non-critical lab and imaging results will be communicated to you by Micromusclehart, letter or phone within 4 business days after the clinic has received the results. If you do not hear from us within 7 days, please contact the clinic through Micromusclehart or phone. If you have a critical or abnormal lab result, we will notify you by phone as soon as possible.  Submit refill requests through Hairbobo or call your pharmacy and they will forward the refill request to us. Please allow 3 business days for your refill to be completed.          Additional Information About Your Visit        Hairbobo Information     Hairbobo gives you secure access to your electronic health record. If you see a primary care provider, you can also send messages to your care team and make appointments. If you have questions, please call your primary care clinic.  If you do not have a primary care provider, please call [phone] and they will assist you.        Care EveryWhere ID     This is your Care EveryWhere ID. This could be used by other  organizations to access your [facility] medical records  [id number]        Your Vitals Were     Pulse Temperature Respirations BMI (Body Mass Index)          60 98.2  F (36.8  C) (Oral) 12 39.06 kg/m2         Blood Pressure from Last 3 Encounters:   09/18/17 128/88   09/08/17 123/85   09/05/17 126/81    Weight from Last 3 Encounters:   09/18/17 242 lb (109.8 kg)   09/08/17 241 lb 6.4 oz (109.5 kg)   09/05/17 238 lb (108 kg)              Today, you had the following     No orders found for display         Today's Medication Changes          These changes are accurate as of: 9/18/17  1:40 PM.  If you have any questions, ask your nurse or doctor.               Start taking these medicines.        Dose/Directions    order for DME   Used for:  Bruised ribs, left, subsequent encounter   Started by:  Susie Berman MD        Abdominal binder   Quantity:  1 Package   Refills:  0         These medicines have changed or have updated prescriptions.        Dose/Directions    * ibuprofen 600 MG tablet   Commonly known as:  ADVIL/MOTRIN   This may have changed:  Another medication with the same name was added. Make sure you understand how and when to take each.   Used for:  Multiple transverse process fractures        Dose:  600 mg   Take 1 tablet (600 mg) by mouth every 6 hours as needed for other (For mild pain or temperature greater than 102F)   Quantity:  50 tablet   Refills:  0       * ibuprofen 800 MG tablet   Commonly known as:  ADVIL/MOTRIN   This may have changed:  You were already taking a medication with the same name, and this prescription was added. Make sure you understand how and when to take each.   Used for:  Bruised ribs, left, subsequent encounter, Lumbar contusion, subsequent encounter   Changed by:  Susie Berman MD        Dose:  800 mg   Take 1 tablet (800 mg) by mouth every 8 hours as needed for moderate pain   Quantity:  60 tablet   Refills:  1       * Notice:  This list has 2  medication(s) that are the same as other medications prescribed for you. Read the directions carefully, and ask your doctor or other care provider to review them with you.         Where to get your medicines      These medications were sent to [facility] [place], [place] - [address]  [address] [facility] [address]     Phone:  [phone]     ibuprofen 800 MG tablet         Some of these will need a paper prescription and others can be bought over the counter.  Ask your nurse if you have questions.     Bring a paper prescription for each of these medications     order for DME                Primary Care Provider    Physician No Ref-Primary       No address on file        Equal Access to Services     LUDY MARIE : Haddamion Vrigen, wacorrine porter, sheila kaaldanilo carranza, cosmo merino . So [facility] [phone].    ATENCIÓN: Si habla español, tiene a llamas disposición servicios gratuitos de asistencia lingüística. [facility] [phone].    We comply with applicable federal civil rights laws and [place] laws. We do not discriminate on the basis of race, color, national origin, age, disability sex, sexual orientation or gender identity.            Thank you!     Thank you for choosing [facility]  for your care. Our goal is always to provide you with excellent care. Hearing back from our patients is one way we can continue to improve our services. Please take a few minutes to complete the written survey that you may receive in the mail after your visit with us. Thank you!             Your Updated Medication List - Protect others around you: Learn how to safely use, store and throw away your medicines at www.disposemymeds.org.          This list is accurate as of: 9/18/17  1:40 PM.  Always use your most recent med list.                   Brand Name Dispense Instructions for use Diagnosis    * ibuprofen 600 MG tablet     ADVIL/MOTRIN    50 tablet    Take 1 tablet (600 mg) by mouth every 6 hours as needed for other (For mild pain or temperature greater than 102F)    Multiple transverse process fractures       * ibuprofen 800 MG tablet    ADVIL/MOTRIN    60 tablet    Take 1 tablet (800 mg) by mouth every 8 hours as needed for moderate pain    Bruised ribs, left, subsequent encounter, Lumbar contusion, subsequent encounter       order for DME     1 Package    Abdominal binder    Bruised ribs, left, subsequent encounter       oxyCODONE-acetaminophen 5-325 MG per tablet    PERCOCET    30 tablet    Take 1-2 tablets by mouth every 4 hours as needed for pain    Bruised ribs, left, subsequent encounter       PRILOSEC PO           tiZANidine 2 MG tablet    ZANAFLEX    30 tablet    Take 0.5-1 tablets (1-2 mg) by mouth 3 times daily    Bruised ribs, left, subsequent encounter       * Notice:  This list has 2 medication(s) that are the same as other medications prescribed for you. Read the directions carefully, and ask your doctor or other care provider to review them with you.

## 2017-09-18 NOTE — LETTER
September 18, 2017      Ry Escobar                                                                                                                                                         [address] [facility] [address] [place] [id number]      OK to return to restricted duty after back injury.  Initial restrictions through his next evaluation next week with Dr. Dumas (originator of        work restrictions).   No lifting over 15 pounds   Minimal climbing up and down restrict to 3 times in an hour.  For further questions or concerns please let us know.     Sincerely,          Dr. West

## 2017-09-18 NOTE — PATIENT INSTRUCTIONS
Ibuprofen 800 mg every 8 hrs   Can use tylenol extra strength in between as needed  Wear abdominal binder for work   Recommend icing at least 2-3 times a day   Rib Contusion     A rib contusion is a bruise to one or more rib bones. It may cause pain, tenderness, swelling and a purplish discoloration. There may be a sharp pain while breathing.  You will be assessed for other injuries. You will likely be given pain medicine. Rib contusions heal on their own, without further treatment. However, pain may take weeks to months to go away.   Note that a small crack (fracture) in the rib may cause the same symptoms as a rib contusion. The small crack may not be seen on a chest X-ray. However, the conditions are managed in the same way.  Home care    Rest. Avoid heavy lifting, strenuous exertion, or any activity that causes pain.    Ice the area to reduce pain and swelling. Put ice cubes in a plastic bag or use a cold pack. (Wrap the cold source in a thin towel. Do not place it directly on your skin.) Ice the injured area for 20 minutes every 1 to 2 hours the first day. Continue with ice packs 3 to 4 times a day for the next 2 days, then as needed for the relief of pain and swelling.    Take any prescribed pain medicine as directed by your healthcare provider. If none was prescribed, take acetaminophen, ibuprofen, or naproxen to control pain.    If you have a significant injury, you may be given a device called an incentive spirometer to keep your lungs healthy. Use as directed.  Follow-up care  Follow up with your healthcare provider during the next week or as directed.  When to seek medical advice  Call your healthcare provider for any of the following:    Shortness of breath or trouble breathing    Increasing chest pain with breathing    Coughing    Dizziness, weakness, or fainting    New or worsening pain    Fever of 100.4 F (38 C) or higher, or as directed by your healthcare provider  Date Last Reviewed: 2/1/2017     0718-0160 [employer]. [address] [place] [address], [place] [address]. All rights reserved. This information is not intended as a substitute for professional medical care. Always follow your healthcare professional's instructions.

## 2017-09-18 NOTE — PROGRESS NOTES
SUBJECTIVE:   Ry Escobar is a 47 year old male who presents to clinic today for the following health issues:      Follow up for Workman's Comp issue. This is for rib, hip pain from a fall at work 9/1/2017.   Patient presents today with Juan Antonio - Catalina Santiago MA, [facility] ([phone]).   Needs work restriction letter adjusted specially in regards to hours.   States he still has significant pain but denies any worsening in symptoms.   Denies any numbness or tingling.   Pain is exacerbated by walking, changing positions and long periods of sitting.   Denies any red flag symptoms.     Problem list and histories reviewed & adjusted, as indicated.  Additional history: as documented    Patient Active Problem List   Diagnosis     Fracture lumbar vertebra-closed (H)     Multiple transverse process fractures     History reviewed. No pertinent surgical history.    Social History   Substance Use Topics     Smoking status: Never Smoker     Smokeless tobacco: Never Used     Alcohol use No      Comment: rare     History reviewed. No pertinent family history.          Reviewed and updated as needed this visit by clinical staffTobacco  Allergies  Med Hx  Surg Hx  Fam Hx  Soc Hx      Reviewed and updated as needed this visit by Provider         ROS:  Constitutional, pulmonary, msk, neuro  systems are negative, except as otherwise noted.      OBJECTIVE:   /88 (BP Location: Right arm, Patient Position: Chair, Cuff Size: Adult Large)  Pulse 60  Temp 98.2  F (36.8  C) (Oral)  Resp 12  Wt 242 lb (109.8 kg)  BMI 39.06 kg/m2  Body mass index is 39.06 kg/(m^2).  GENERAL: healthy, alert and no distress  MS: lateral left chest TTP, left low back- TTP, antalgic gait, sensation intact   SKIN: no suspicious lesions or rashes    Diagnostic Test Results:  none     ASSESSMENT/PLAN:         1. Bruised ribs, left, subsequent encounter  -stable. Will continue with lifting restriction. New letter written.   - order for  DME; Abdominal binder  Dispense: 1 Package; Refill: 0  - ibuprofen (ADVIL/MOTRIN) 800 MG tablet; Take 1 tablet (800 mg) by mouth every 8 hours as needed for moderate pain  Dispense: 60 tablet; Refill: 1    2. Lumbar contusion, subsequent encounter  - will benefit from NSAID use. Supportive care discussed   - ibuprofen (ADVIL/MOTRIN) 800 MG tablet; Take 1 tablet (800 mg) by mouth every 8 hours as needed for moderate pain  Dispense: 60 tablet; Refill: 1    See Patient Instructions    Susie Berman MD  [facility]

## 2017-09-18 NOTE — NURSING NOTE
"Chief Complaint   Patient presents with     Work Comp     Rib, Kidney, Hip       Initial /88 (BP Location: Right arm, Patient Position: Chair, Cuff Size: Adult Large)  Pulse 60  Temp 98.2  F (36.8  C) (Oral)  Resp 12  Wt 242 lb (109.8 kg)  BMI 39.06 kg/m2 Estimated body mass index is 39.06 kg/(m^2) as calculated from the following:    Height as of 9/8/17: 5' 6\" (1.676 m).    Weight as of this encounter: 242 lb (109.8 kg).  Medication Reconciliation: complete   Michael Baldwin MA      "

## 2017-09-27 ENCOUNTER — OFFICE VISIT (OUTPATIENT)
Dept: FAMILY MEDICINE | Facility: CLINIC | Age: 47
End: 2017-09-27
Payer: OTHER MISCELLANEOUS

## 2017-09-27 VITALS
HEART RATE: 59 BPM | WEIGHT: 243.2 LBS | BODY MASS INDEX: 39.08 KG/M2 | SYSTOLIC BLOOD PRESSURE: 119 MMHG | TEMPERATURE: 97.7 F | HEIGHT: 66 IN | RESPIRATION RATE: 14 BRPM | DIASTOLIC BLOOD PRESSURE: 82 MMHG | OXYGEN SATURATION: 96 %

## 2017-09-27 DIAGNOSIS — S20.212D: ICD-10-CM

## 2017-09-27 PROCEDURE — 99213 OFFICE O/P EST LOW 20 MIN: CPT | Performed by: FAMILY MEDICINE

## 2017-09-27 RX ORDER — TIZANIDINE 2 MG/1
1-2 TABLET ORAL 3 TIMES DAILY
Qty: 30 TABLET | Refills: 0 | Status: SHIPPED | OUTPATIENT
Start: 2017-09-27 | End: 2018-10-15 | Stop reason: ALTCHOICE

## 2017-09-27 NOTE — NURSING NOTE
"Chief Complaint   Patient presents with     RECHECK       Initial /82 (BP Location: Left arm, Patient Position: Chair, Cuff Size: Adult Large)  Pulse 59  Temp 97.7  F (36.5  C) (Oral)  Resp 14  Ht 5' 6\" (1.676 m)  Wt 243 lb 3.2 oz (110.3 kg)  SpO2 96%  BMI 39.25 kg/m2 Estimated body mass index is 39.25 kg/(m^2) as calculated from the following:    Height as of this encounter: 5' 6\" (1.676 m).    Weight as of this encounter: 243 lb 3.2 oz (110.3 kg).  Medication Reconciliation: complete   Gato Jha CMA       "

## 2017-09-27 NOTE — PROGRESS NOTES
SUBJECTIVE:   Ry Escobar is a 47 year old male who presents to clinic today for the following health issues:    Back Subjective:He's here with his C:   Transitioning back to work duties          Symptoms began:   4 week(s) ago       Symptoms changing:  onset gradual improvement,  Left rib pain dominates               Location:  middle of back left region       Radiation to radiates into the left buttocks       At worst a 7 on a scale of 1-10.         Personal hx of back pain is:  recurrent self limited episodes of low back pain in the past.       Pain is exacerbated by: lifting, standing, sitting and bending.       Pain is relieved by: ice and rest.       Associated sx include: denies.       Previous plain films obtained: No.        Results: no rib fracture, rib contusion .       Red flag symptoms: negative    (S20.212D) Bruised ribs, left, subsequent encounter  Comment:   Plan: tiZANidine (ZANAFLEX) 2 MG tablet        Graduated return to full duties continues     Discussed job demands and skills see notes    .

## 2017-09-27 NOTE — MR AVS SNAPSHOT
"              After Visit Summary   9/27/2017    Ry Escobar    MRN: [id number]           Patient Information     Date Of Birth          1970        Visit Information        Provider Department      9/27/2017 2:30 PM Gelacio Dumas MD [facility]        Today's Diagnoses     Bruised ribs, left, subsequent encounter           Follow-ups after your visit        Who to contact     If you have questions or need follow up information about today's clinic visit or your schedule please contact [facility] directly at [phone].  Normal or non-critical lab and imaging results will be communicated to you by [employer], letter or phone within 4 business days after the clinic has received the results. If you do not hear from us within 7 days, please contact the clinic through [employer] or phone. If you have a critical or abnormal lab result, we will notify you by phone as soon as possible.  Submit refill requests through [email or web address] or call your pharmacy and they will forward the refill request to us. Please allow 3 business days for your refill to be completed.          Additional Information About Your Visit        MyChart Information     [email or web address] gives you secure access to your electronic health record. If you see a primary care provider, you can also send messages to your care team and make appointments. If you have questions, please call your primary care clinic.  If you do not have a primary care provider, please call [phone] and they will assist you.        Care EveryWhere ID     This is your Care EveryWhere ID. This could be used by other organizations to access your [facility] medical records  [id number]        Your Vitals Were     Pulse Temperature Respirations Height Pulse Oximetry BMI (Body Mass Index)    59 97.7  F (36.5  C) (Oral) 14 5' 6\" (1.676 m) 96% 39.25 kg/m2       Blood Pressure from Last 3 Encounters:   09/27/17 119/82   09/18/17 128/88   09/08/17 123/85 "    Weight from Last 3 Encounters:   09/27/17 243 lb 3.2 oz (110.3 kg)   09/18/17 242 lb (109.8 kg)   09/08/17 241 lb 6.4 oz (109.5 kg)              Today, you had the following     No orders found for display         Where to get your medicines      [place] [facility] [place] [address]  [address] [facility] [address]     Phone:  [phone]     tiZANidine 2 MG tablet          Primary Care Provider Office Phone # Fax #    Gelacio Dumas [phone] [address] [facility] [address]        Equal Access to Services     LUDY MARIE : Marga colemano Promise, waaxda luqadaha, qaybta kaalmada nasimayasven, cosmo merino . So [facility] [phone].    ATENCIÓN: Si habla español, tiene a llamas disposición servicios gratuitos de asistencia lingüística. [facility] [phone].    We comply with applicable federal civil rights laws and [place] laws. We do not discriminate on the basis of race, color, national origin, age, disability sex, sexual orientation or gender identity.            Thank you!     Thank you for choosing [facility]  for your care. Our goal is always to provide you with excellent care. Hearing back from our patients is one way we can continue to improve our services. Please take a few minutes to complete the written survey that you may receive in the mail after your visit with us. Thank you!             Your Updated Medication List - Protect others around you: Learn how to safely use, store and throw away your medicines at www.disposemymeds.org.          This list is accurate as of: 9/27/17  3:06 PM.  Always use your most recent med list.                   Brand Name Dispense Instructions for use Diagnosis    * ibuprofen 600 MG tablet    ADVIL/MOTRIN    50 tablet    Take 1 tablet (600 mg) by mouth every 6 hours as needed for other (For mild pain or temperature  greater than 102F)    Multiple transverse process fractures       * ibuprofen 800 MG tablet    ADVIL/MOTRIN    60 tablet    Take 1 tablet (800 mg) by mouth every 8 hours as needed for moderate pain    Bruised ribs, left, subsequent encounter, Lumbar contusion, subsequent encounter       order for DME     1 Package    Abdominal binder    Bruised ribs, left, subsequent encounter       oxyCODONE-acetaminophen 5-325 MG per tablet    PERCOCET    30 tablet    Take 1-2 tablets by mouth every 4 hours as needed for pain    Bruised ribs, left, subsequent encounter       PRILOSEC PO           tiZANidine 2 MG tablet    ZANAFLEX    30 tablet    Take 0.5-1 tablets (1-2 mg) by mouth 3 times daily    Bruised ribs, left, subsequent encounter       * Notice:  This list has 2 medication(s) that are the same as other medications prescribed for you. Read the directions carefully, and ask your doctor or other care provider to review them with you.

## 2017-09-27 NOTE — LETTER
[facility]  [address] [place], [place], [address]  [phone]        September 27, 2017    Ry Escobar                                                                                                                                                       [address] [facility] [address]  OK to continue restricted work.   No lifting over 15 pounds.  Minimal climbing up and down restrict to 3 times in an hour.   Restrictions until followup visit in two weeks.           Gelacio Dumas MD

## 2017-10-23 ENCOUNTER — TELEPHONE (OUTPATIENT)
Dept: FAMILY MEDICINE | Facility: CLINIC | Age: 47
End: 2017-10-23

## 2017-10-23 NOTE — LETTER
October 24, 2017      Ry Escobar  [address] [address]        To Whom It May Concern:    Ry Escobar is seen in our clinic.     He may return to work with the following: No restrictions since on or about 10/2/2017.    Sincerely,        Gelacio Dumas MD

## 2017-10-23 NOTE — TELEPHONE ENCOUNTER
Patient is requesting that Dr. Dumas release him to go back to work. Please call patient.     Please also call Catalina Santiago MA, JOHN at [phone] to inform her as well that he is able/not able to return to work.

## 2017-10-24 NOTE — TELEPHONE ENCOUNTER
Pt call states employer seems to think I need a note that I don't need restrictions.   He has been working for 3 weeks without restrictions yet was written up yesterday because not released.   He gave us verbal OK to call Catalina and fax letter if needed.   Pt will view letter in [employer].   Letter t'd up.   Ramon Salinas RN

## 2017-10-25 NOTE — TELEPHONE ENCOUNTER
Pt called again, faxed to [phone] and sent to [email or web address] -[email or web address]-    Best Taylor   10/25/17 4:03 PM

## 2017-10-25 NOTE — TELEPHONE ENCOUNTER
"Catalina Santiago called.     She did not receive faxed letter with the new lifted work restrictions.     Please fax letter to\" [phone]\"      Nata THACKER RN, BSN, PHN  [place] [facility] RN    "

## 2018-10-11 ENCOUNTER — HOSPITAL ENCOUNTER (EMERGENCY)
Facility: CLINIC | Age: 48
Discharge: HOME | End: 2018-10-11
Attending: EMERGENCY MEDICINE | Admitting: EMERGENCY MEDICINE
Payer: OTHER MISCELLANEOUS

## 2018-10-11 VITALS
SYSTOLIC BLOOD PRESSURE: 123 MMHG | OXYGEN SATURATION: 97 % | TEMPERATURE: 97.8 F | BODY MASS INDEX: 32.47 KG/M2 | RESPIRATION RATE: 18 BRPM | HEIGHT: 66 IN | WEIGHT: 202 LBS | DIASTOLIC BLOOD PRESSURE: 75 MMHG

## 2018-10-11 DIAGNOSIS — M54.50 ACUTE BILATERAL LOW BACK PAIN WITHOUT SCIATICA: ICD-10-CM

## 2018-10-11 LAB
ALBUMIN UR-MCNC: NEGATIVE MG/DL
APPEARANCE UR: CLEAR
BILIRUB UR QL STRIP: NEGATIVE
COLOR UR AUTO: YELLOW
GLUCOSE UR STRIP-MCNC: NEGATIVE MG/DL
HGB UR QL STRIP: NEGATIVE
KETONES UR STRIP-MCNC: NEGATIVE MG/DL
LEUKOCYTE ESTERASE UR QL STRIP: NEGATIVE
MUCOUS THREADS #/AREA URNS LPF: PRESENT /LPF
NITRATE UR QL: NEGATIVE
PH UR STRIP: 5 PH (ref 5–7)
RBC #/AREA URNS AUTO: 1 /HPF (ref 0–2)
SOURCE: ABNORMAL
SP GR UR STRIP: 1.02 (ref 1–1.03)
UROBILINOGEN UR STRIP-MCNC: 0 MG/DL (ref 0–2)
WBC #/AREA URNS AUTO: 1 /HPF (ref 0–5)

## 2018-10-11 PROCEDURE — 96375 TX/PRO/DX INJ NEW DRUG ADDON: CPT

## 2018-10-11 PROCEDURE — 81001 URINALYSIS AUTO W/SCOPE: CPT | Performed by: EMERGENCY MEDICINE

## 2018-10-11 PROCEDURE — 96374 THER/PROPH/DIAG INJ IV PUSH: CPT

## 2018-10-11 PROCEDURE — 25000128 H RX IP 250 OP 636: Performed by: EMERGENCY MEDICINE

## 2018-10-11 PROCEDURE — 96361 HYDRATE IV INFUSION ADD-ON: CPT

## 2018-10-11 PROCEDURE — 99285 EMERGENCY DEPT VISIT HI MDM: CPT | Mod: 25

## 2018-10-11 RX ORDER — METHOCARBAMOL 500 MG/1
1000 TABLET, FILM COATED ORAL 3 TIMES DAILY PRN
Qty: 30 TABLET | Refills: 1 | Status: SHIPPED | OUTPATIENT
Start: 2018-10-11

## 2018-10-11 RX ORDER — OXYCODONE HYDROCHLORIDE 5 MG/1
5 TABLET ORAL EVERY 6 HOURS PRN
Qty: 12 TABLET | Refills: 0 | Status: SHIPPED | OUTPATIENT
Start: 2018-10-11

## 2018-10-11 RX ORDER — KETOROLAC TROMETHAMINE 15 MG/ML
15 INJECTION, SOLUTION INTRAMUSCULAR; INTRAVENOUS ONCE
Status: COMPLETED | OUTPATIENT
Start: 2018-10-11 | End: 2018-10-11

## 2018-10-11 RX ORDER — SODIUM CHLORIDE 9 MG/ML
1000 INJECTION, SOLUTION INTRAVENOUS CONTINUOUS
Status: DISCONTINUED | OUTPATIENT
Start: 2018-10-11 | End: 2018-10-11 | Stop reason: HOSPADM

## 2018-10-11 RX ORDER — DIAZEPAM 10 MG/2ML
5 INJECTION, SOLUTION INTRAMUSCULAR; INTRAVENOUS ONCE
Status: COMPLETED | OUTPATIENT
Start: 2018-10-11 | End: 2018-10-11

## 2018-10-11 RX ORDER — ONDANSETRON 2 MG/ML
4 INJECTION INTRAMUSCULAR; INTRAVENOUS EVERY 30 MIN PRN
Status: DISCONTINUED | OUTPATIENT
Start: 2018-10-11 | End: 2018-10-11 | Stop reason: HOSPADM

## 2018-10-11 RX ORDER — LIDOCAINE 40 MG/G
CREAM TOPICAL
Status: DISCONTINUED | OUTPATIENT
Start: 2018-10-11 | End: 2018-10-11 | Stop reason: HOSPADM

## 2018-10-11 RX ORDER — METHYLPREDNISOLONE 4 MG
TABLET, DOSE PACK ORAL
Qty: 21 TABLET | Refills: 0 | Status: SHIPPED | OUTPATIENT
Start: 2018-10-11 | End: 2023-02-15 | Stop reason: DRUGHIGH

## 2018-10-11 RX ORDER — HYDROMORPHONE HYDROCHLORIDE 1 MG/ML
1 INJECTION, SOLUTION INTRAMUSCULAR; INTRAVENOUS; SUBCUTANEOUS EVERY 30 MIN PRN
Status: DISCONTINUED | OUTPATIENT
Start: 2018-10-11 | End: 2018-10-11 | Stop reason: HOSPADM

## 2018-10-11 RX ADMIN — DIAZEPAM 5 MG: 5 INJECTION, SOLUTION INTRAMUSCULAR; INTRAVENOUS at 16:25

## 2018-10-11 RX ADMIN — SODIUM CHLORIDE 1000 ML: 9 INJECTION, SOLUTION INTRAVENOUS at 16:58

## 2018-10-11 RX ADMIN — ONDANSETRON 4 MG: 2 INJECTION INTRAMUSCULAR; INTRAVENOUS at 16:23

## 2018-10-11 RX ADMIN — KETOROLAC TROMETHAMINE 15 MG: 15 INJECTION, SOLUTION INTRAMUSCULAR; INTRAVENOUS at 16:35

## 2018-10-11 RX ADMIN — Medication 1 MG: at 16:38

## 2018-10-11 ASSESSMENT — ENCOUNTER SYMPTOMS: BACK PAIN: 1

## 2018-10-11 NOTE — DISCHARGE INSTRUCTIONS
Back Exercises: Lower Back Stretch    To start, sit in a chair with your feet flat on the floor. Shift your weight slightly forward. Relax, and keep your ears, shoulders, and hips aligned while you do the following:    Sit with your feet well apart.    Bend forward and touch the floor with the backs of your hands. Relax and let your body drop.    Hold for 20 seconds. Return to starting position.    Repeat 2 times.   Date Last Reviewed: 11/1/2017 2000-2017 [employer]. [address] [place], [place] [address]. All rights reserved. This information is not intended as a substitute for professional medical care. Always follow your healthcare professional's instructions.      Opioid Medication Information    You have been given a prescription for an opioid (narcotic) pain medicine and/or have received a pain medicine while here in the Emergency Department. These medicines can make you drowsy or impaired. You must not drive, operate dangerous equipment, or engage in any other dangerous activities while taking these medications. If you drive while taking these medications, you could be arrested for DUI, or driving under the influence. Do not drink any alcohol while you are taking these medications.   Opioid pain medications can cause addiction. If you have a history of chemical dependency of any type, you are at a higher risk of becoming addicted to pain medications.  Only take these prescribed medications to treat your pain when all other options have been tried. Take it for as short a time and as few doses as possible. Store your pain pills in a secure place, as they are frequently stolen and provide a dangerous opportunity for children or visitors in your house to start abusing these powerful medications. We will not replace any lost or stolen medicine.  As soon as your pain is better, you should flush all your remaining medication.   Many prescription pain medications contain Tylenol   (acetaminophen), including Vicodin , Tylenol #3 , Norco , Lortab , and Percocet .  You should not take any extra pills of Tylenol  if you are using these prescription medications or you can get very sick.  Do not ever take more than 4000 mg of acetaminophen in any 24 hour period.  All opioids tend to cause constipation. Drink plenty of water and eat foods that have a lot of fiber, such as fruits, vegetables, prune juice, apple juice and high fiber cereal.  Take a laxative if you don t move your bowels at least every other day. Miralax , Milk of Magnesia, Colace , or Senna  can be used to keep you regular.

## 2018-10-11 NOTE — LETTER
October 11, 2018      To Whom It May Concern:      Ry Escobar was seen in our Emergency Department today, 10/11/18.  I expect his condition to improve over the next three days.  He may return to work/school when improved.    Sincerely,        Jenna Ponce RN

## 2018-10-11 NOTE — ED AVS SNAPSHOT
[facility] Emergency Department    [address]    [facility] [address]    Phone:  [phone]    Fax:  [phone]                                       Ry Escobar   MRN: [id number]    Department:  [facility] Emergency Department   Date of Visit:  10/11/2018           After Visit Summary Signature Page     I have received my discharge instructions, and my questions have been answered. I have discussed any challenges I see with this plan with the nurse or doctor.    ..........................................................................................................................................  Patient/Patient Representative Signature      ..........................................................................................................................................  Patient Representative Print Name and Relationship to Patient    ..................................................               ................................................  Date                                   Time    ..........................................................................................................................................  Reviewed by Signature/Title    ...................................................              ..............................................  Date                                               Time          22EPIC Rev 08/18

## 2018-10-11 NOTE — ED AVS SNAPSHOT
[facility] Emergency Department    [address]    [facility] [address]    Phone:  [phone]    Fax:  [phone]                                       Ry Ecsobar   MRN: [id number]    Department:  [facility] Emergency Department   Date of Visit:  10/11/2018           Patient Information     Date Of Birth          1970        Your diagnoses for this visit were:     Acute bilateral low back pain without sciatica        You were seen by Maricruz Chatterjee MD.      Follow-up Information     Follow up with [facility]. Go in 1 week.    Specialties:  Sports Medicine, Pain Management, Obstetrics & Gynecology, Pediatrics, Internal Medicine, Nephrology    Contact information:    [address]  [facility] [address] [phone]        Discharge Instructions         Back Exercises: Lower Back Stretch    To start, sit in a chair with your feet flat on the floor. Shift your weight slightly forward. Relax, and keep your ears, shoulders, and hips aligned while you do the following:    Sit with your feet well apart.    Bend forward and touch the floor with the backs of your hands. Relax and let your body drop.    Hold for 20 seconds. Return to starting position.    Repeat 2 times.   Date Last Reviewed: 11/1/2017 2000-2017 [employer]. [address] [place] [address]. All rights reserved. This information is not intended as a substitute for professional medical care. Always follow your healthcare professional's instructions.      Opioid Medication Information    You have been given a prescription for an opioid (narcotic) pain medicine and/or have received a pain medicine while here in the Emergency Department. These medicines can make you drowsy or impaired. You must not drive, operate dangerous equipment, or engage in any other dangerous activities while taking these medications. If you drive while taking  these medications, you could be arrested for DUI, or driving under the influence. Do not drink any alcohol while you are taking these medications.   Opioid pain medications can cause addiction. If you have a history of chemical dependency of any type, you are at a higher risk of becoming addicted to pain medications.  Only take these prescribed medications to treat your pain when all other options have been tried. Take it for as short a time and as few doses as possible. Store your pain pills in a secure place, as they are frequently stolen and provide a dangerous opportunity for children or visitors in your house to start abusing these powerful medications. We will not replace any lost or stolen medicine.  As soon as your pain is better, you should flush all your remaining medication.   Many prescription pain medications contain Tylenol  (acetaminophen), including Vicodin , Tylenol #3 , Norco , Lortab , and Percocet .  You should not take any extra pills of Tylenol  if you are using these prescription medications or you can get very sick.  Do not ever take more than 4000 mg of acetaminophen in any 24 hour period.  All opioids tend to cause constipation. Drink plenty of water and eat foods that have a lot of fiber, such as fruits, vegetables, prune juice, apple juice and high fiber cereal.  Take a laxative if you don t move your bowels at least every other day. Miralax , Milk of Magnesia, Colace , or Senna  can be used to keep you regular.            24 Hour Appointment Hotline       To make an appointment at any [facility], call [phone] ([phone]). If you don't have a family doctor or clinic, we will help you find one. [facility] clinics are conveniently located to serve the needs of you and your family.          ED Discharge Orders     [facility] PT, HAND, AND CHIROPRACTIC REFERRAL       Physical Therapy, Hand Therapy and Chiropractic Care are available through:  *[facility]  *Hand  Therapy (Occupational Therapy or Physical Therapy)  *[facility]    Call one number to schedule at any of the above locations: [phone].    Physical therapy, Hand therapy and/or Chiropractic care has been recommended by your physician as an excellent treatment option to reduce pain and help people return to normal activities, including sports.  Therapy and/or chiropractic care services are a great complement or alternative to expensive and invasive surgery, injections, or long-term use of prescription medications. The primary goal is to identify the underlying problem and provide you the tools to manage your condition on your own.     Please be aware that coverage of these services is subject to the terms and limitations of your health insurance plan.  Call member services at your health plan with any benefit or coverage questions.      Please bring the following to your appointment:  *Your personal calendar for scheduling future appointments  *Comfortable clothing                     Review of your medicines      START taking        Dose / Directions Last dose taken    methocarbamol 500 MG tablet   Commonly known as:  ROBAXIN   Dose:  1000 mg   Quantity:  30 tablet        Take 2 tablets (1,000 mg) by mouth 3 times daily as needed for muscle spasms   Refills:  1        methylPREDNISolone 4 MG tablet   Commonly known as:  MEDROL DOSEPAK   Quantity:  21 tablet        Follow package instructions   Refills:  0        oxyCODONE IR 5 MG tablet   Commonly known as:  ROXICODONE   Dose:  5 mg   Quantity:  12 tablet        Take 1 tablet (5 mg) by mouth every 6 hours as needed for pain   Refills:  0          Our records show that you are taking the medicines listed below. If these are incorrect, please call your family doctor or clinic.        Dose / Directions Last dose taken    * ibuprofen 600 MG tablet   Commonly known as:  ADVIL/MOTRIN   Dose:  600 mg   Quantity:  50 tablet        Take 1  tablet (600 mg) by mouth every 6 hours as needed for other (For mild pain or temperature greater than 102F)   Refills:  0        * ibuprofen 800 MG tablet   Commonly known as:  ADVIL/MOTRIN   Dose:  800 mg   Quantity:  60 tablet        Take 1 tablet (800 mg) by mouth every 8 hours as needed for moderate pain   Refills:  1        order for DME   Quantity:  1 Package        Abdominal binder   Refills:  0        PRILOSEC PO        Refills:  0        tiZANidine 2 MG tablet   Commonly known as:  ZANAFLEX   Dose:  1-2 mg   Quantity:  30 tablet        Take 0.5-1 tablets (1-2 mg) by mouth 3 times daily   Refills:  0        * Notice:  This list has 2 medication(s) that are the same as other medications prescribed for you. Read the directions carefully, and ask your doctor or other care provider to review them with you.      STOP taking        Dose Reason for stopping Comments    oxyCODONE-acetaminophen 5-325 MG per tablet   Commonly known as:  PERCOCET                      Information about OPIOIDS     PRESCRIPTION OPIOIDS: WHAT YOU NEED TO KNOW   We gave you an opioid (narcotic) pain medicine. It is important to manage your pain, but opioids are not always the best choice. You should first try all the other options your care team gave you. Take this medicine for as short a time (and as few doses) as possible.    Some activities can increase your pain, such as bandage changes or therapy sessions. It may help to take your pain medicine 30 to 60 minutes before these activities. Reduce your stress by getting enough sleep, working on hobbies you enjoy and practicing relaxation or meditation. Talk to your care team about ways to manage your pain beyond prescription opioids.    These medicines have risks:    DO NOT drive when on new or higher doses of pain medicine. These medicines can affect your alertness and reaction times, and you could be arrested for driving under the influence (DUI). If you need to use opioids long-term,  talk to your care team about driving.    DO NOT operate heavy machinery    DO NOT do any other dangerous activities while taking these medicines.    DO NOT drink any alcohol while taking these medicines.     If the opioid prescribed includes acetaminophen, DO NOT take with any other medicines that contain acetaminophen. Read all labels carefully. Look for the word  acetaminophen  or  Tylenol.  Ask your pharmacist if you have questions or are unsure.    You can get addicted to pain medicines, especially if you have a history of addiction (chemical, alcohol or substance dependence). Talk to your care team about ways to reduce this risk.    All opioids tend to cause constipation. Drink plenty of water and eat foods that have a lot of fiber, such as fruits, vegetables, prune juice, apple juice and high-fiber cereal. Take a laxative (Miralax, milk of magnesia, Colace, Senna) if you don t move your bowels at least every other day. Other side effects include upset stomach, sleepiness, dizziness, throwing up, tolerance (needing more of the medicine to have the same effect), physical dependence and slowed breathing.    Store your pills in a secure place, locked if possible. We will not replace any lost or stolen medicine. If you don t finish your medicine, please throw away (dispose) as directed by your pharmacist. The [place] [employer] has more information about safe disposal: https://www.pca.state.[place]/living-green/managing-unwanted-medications        Prescriptions were sent or printed at these locations (3 Prescriptions)                   Other Prescriptions                Printed at Department/Unit printer (3 of 3)         methocarbamol (ROBAXIN) 500 MG tablet               methylPREDNISolone (MEDROL DOSEPAK) 4 MG tablet               oxyCODONE IR (ROXICODONE) 5 MG tablet                Procedures and tests performed during your visit     Peripheral IV catheter    Pulse oximetry nursing    UA with  Microscopic      Orders Needing Specimen Collection     None      Pending Results     No orders found from 10/9/2018 to 10/12/2018.            Pending Culture Results     No orders found from 10/9/2018 to 10/12/2018.            Pending Results Instructions     If you had any lab results that were not finalized at the time of your Discharge, you can call the ED Lab Result RN at [phone]. You will be contacted by this team for any positive Lab results or changes in treatment. The nurses are available 7 days a week from 10A to 6:30P.  You can leave a message 24 hours per day and they will return your call.        Test Results From Your Hospital Stay        10/11/2018  5:30 PM      Component Results     Component Value Ref Range & Units Status    Color Urine Yellow  Final    Appearance Urine Clear  Final    Glucose Urine Negative NEG^Negative mg/dL Final    Bilirubin Urine Negative NEG^Negative Final    Ketones Urine Negative NEG^Negative mg/dL Final    Specific Gravity Urine 1.017 1.003 - 1.035 Final    Blood Urine Negative NEG^Negative Final    pH Urine 5.0 5.0 - 7.0 pH Final    Protein Albumin Urine Negative NEG^Negative mg/dL Final    Urobilinogen mg/dL 0.0 0.0 - 2.0 mg/dL Final    Nitrite Urine Negative NEG^Negative Final    Leukocyte Esterase Urine Negative NEG^Negative Final    Source Midstream Urine  Final    WBC Urine 1 0 - 5 /HPF Final    RBC Urine 1 0 - 2 /HPF Final    Mucous Urine Present (A) NEG^Negative /LPF Final                Clinical Quality Measure: Blood Pressure Screening     Your blood pressure was checked while you were in the emergency department today. The last reading we obtained was  BP: 123/75 . Please read the guidelines below about what these numbers mean and what you should do about them.  If your systolic blood pressure (the top number) is less than 120 and your diastolic blood pressure (the bottom number) is less than 80, then your blood pressure is normal. There is nothing more that  you need to do about it.  If your systolic blood pressure (the top number) is 120-139 or your diastolic blood pressure (the bottom number) is 80-89, your blood pressure may be higher than it should be. You should have your blood pressure rechecked within a year by a primary care provider.  If your systolic blood pressure (the top number) is 140 or greater or your diastolic blood pressure (the bottom number) is 90 or greater, you may have high blood pressure. High blood pressure is treatable, but if left untreated over time it can put you at risk for heart attack, stroke, or kidney failure. You should have your blood pressure rechecked by a primary care provider within the next 4 weeks.  If your provider in the emergency department today gave you specific instructions to follow-up with your doctor or provider even sooner than that, you should follow that instruction and not wait for up to 4 weeks for your follow-up visit.        Thank you for choosing [facility]       Thank you for choosing [facility] for your care. Our goal is always to provide you with excellent care. Hearing back from our patients is one way we can continue to improve our services. Please take a few minutes to complete the written survey that you may receive in the mail after you visit with us. Thank you!        [employer] Information     [facility] gives you secure access to your electronic health record. If you see a primary care provider, you can also send messages to your care team and make appointments. If you have questions, please call your primary care clinic.  If you do not have a primary care provider, please call [phone] and they will assist you.        Care EveryWhere ID     This is your Care EveryWhere ID. This could be used by other organizations to access your [facility] medical records  [id number]        Equal Access to Services     LUDY MARIE AH: Marga Virgen, munir porter, cosmo card  odessa merino ah. So [facility] [phone].    ATENCIÓN: Si habla español, tiene a llamas disposición servicios gratuitos de asistencia lingüística. Llame al [phone].    We comply with applicable federal civil rights laws and [place] laws. We do not discriminate on the basis of race, color, national origin, age, disability, sex, sexual orientation, or gender identity.            After Visit Summary       This is your record. Keep this with you and show to your community pharmacist(s) and doctor(s) at your next visit.

## 2018-10-11 NOTE — ED NOTES
Bed: ED11  Expected date: 10/11/18  Expected time: 3:07 PM  Means of arrival: Ambulance  Comments:  desmond Chen

## 2018-10-11 NOTE — ED NOTES
Pt got out of bed slowly and walked about 40 feet in [place] using walker.  He reports using a walker in the past.  He has pain with each step.  Gait was steady.

## 2018-10-11 NOTE — ED TRIAGE NOTES
Pt has known lumbar fracture from 2 years ago.  He had worsening of pain on Sunday while at work.  He received meds from EMS have improved the spasm pain in lower back

## 2018-10-11 NOTE — ED PROVIDER NOTES
"  History     Chief Complaint:  Back Pain    The history is provided by the patient.      Ry Escobar is a 48 year old male with a history of back fracture, kidney stones, and GERD who presents to the emergency department for evaluation of back pain.  Of note, the patient has a history of back fracture L2-L3, no surgery, in 2012. Since then, the patient reports his back pain flares up about once a year. He has prescription medication (Percocet) but he normally only takes ibuprofen and tylenol for pain management daily, which he has taken today. More recently, five days ago while bending over to  a plate from the floor, the patient report the onset of central back pain when he central lower back \"locked up on him\". He denies actually picking up the plate. He states pain extends down into his left hip but he denies pain or numbness into legs. He denies pain into his left side. The continuation of back pain today without relief with ibuprofen and tylenol prompted the patient to seek evaluation here in the emergency department.    Allergies:  Robitussin     Medications:    Percocet  Omeprazole     Past Medical History:    GERD  Kidney stones  Fracture lumbar vertebra-closed    Past Surgical History:    The patient does not have any pertinent past surgical history.    Family History:    No past pertinent family history.    Social History:  Negative for tobacco use.  Negative for alcohol use.  Marital Status:        Review of Systems   Musculoskeletal: Positive for back pain.   All other systems reviewed and are negative.    Physical Exam   First Vitals:  BP: 118/79  Heart Rate: 57  Temp: 97.8  F (36.6  C)  Resp: 18  Height: 167.6 cm (5' 6\")  Weight: 91.6 kg (202 lb)  SpO2: 99 %    Physical Exam   Constitutional: He appears well-developed and well-nourished.   Cardiovascular: Normal rate, regular rhythm, normal heart sounds and intact distal pulses.  Exam reveals no gallop and no friction rub.    No " murmur heard.  Pulmonary/Chest: Effort normal and breath sounds normal. No respiratory distress. He has no wheezes. He has no rales.   Abdominal: Soft. Bowel sounds are normal. He exhibits no distension and no mass. There is no tenderness.   Musculoskeletal: Normal range of motion. He exhibits tenderness. He exhibits no edema.        Lumbar back: He exhibits tenderness.   Left sided diffuse lumbar tenderness. Strength 5/5 in all extremities.  No sensory deficits.   Neurological: He is alert. He has normal reflexes.   Skin: Skin is warm and dry. No rash noted. No erythema. No pallor.   Psychiatric: He has a normal mood and affect.     Emergency Department Course   Laboratory:  UA with micro: mucous present o/w negative    Interventions:  1623 Zofran, 4 mg, IV injection  1625 Valium 5 mg IV  1635 Toradol 15 mg IV  1638 Dilaudid, 1 mg, IV injection  1658 NS 1L IV    Emergency Department Course:  1556 Nursing notes and vitals reviewed.  I performed an exam of the patient as documented above.     IV inserted. Medicine administered as documented above.     The patient provided a urine sample here in the emergency department. This was sent for laboratory testing, findings above.     1917 I rechecked the patient and discussed the results of his workup thus far. The patient's pain is currently 0/10.     The patient was ambulated here in the emergency department without difficulty.     Findings and plan explained to the Patient. Patient discharged home with instructions regarding supportive care, medications, and reasons to return. The importance of close follow-up was reviewed. The patient was prescribed Robaxin and Oxycodone.     I personally reviewed the laboratory results with the Patient and answered all related questions prior to discharge.  Impression & Plan    Medical Decision Making:  Ry Escobar is a 48 year old male who presents for evaluation of back pain that started after bending over to pick a plate off of  the floor.  He has a history of back pain in the past, noting yearly flare up since sustaining a closed fracture of L2-L3 in 2012.  Pain has improved with interventions in the emergency department. The patient did not sustain any trauma, therefore x-rays are not necessary due to the low likelihood of fracture or subluxation.  No red flag symptoms to suggest CT and/or MRI is indicated at this point.  There is no clinical evidence of cauda equina syndrome, discitis, spinal/epidural space hematoma or epidural abscess or other worrisome etiology. The neurological exam is normal and the patient's symptoms seem consistent with musculoskeletal issues and significant muscle spasm.   The patient will be discharged with pain medications to use as directed. Ice or heat to the back and stretching exercises. No heavy lifting, bending or twisting. Return if increasing pain, numbness, weakness, or bowel or bladder dysfunction. He was advised to schedule follow-up with his primary doctor within 3 days to re-assess symptoms.      Diagnosis:    ICD-10-CM    1. Acute bilateral low back pain without sciatica M54.5 [facility] PT, HAND, AND CHIROPRACTIC REFERRAL       Disposition:  discharged to home    Discharge Medications:  New Prescriptions    METHOCARBAMOL (ROBAXIN) 500 MG TABLET    Take 2 tablets (1,000 mg) by mouth 3 times daily as needed for muscle spasms    OXYCODONE IR (ROXICODONE) 5 MG TABLET    Take 1 tablet (5 mg) by mouth every 6 hours as needed for pain     Scribe Disclosure:   I, Isabella Valdivia, am serving as a scribe on 10/11/2018 at 3:55 PM to personally document services performed by Maricruz Chatterjee MD based on my observations and the provider's statements to me.     Isabella Valdivia  10/11/2018   [facility] EMERGENCY DEPARTMENT       Maricruz Chatterjee MD  10/11/18 2054

## 2018-10-15 ENCOUNTER — OFFICE VISIT (OUTPATIENT)
Dept: INTERNAL MEDICINE | Facility: CLINIC | Age: 48
End: 2018-10-15
Payer: OTHER MISCELLANEOUS

## 2018-10-15 VITALS
SYSTOLIC BLOOD PRESSURE: 108 MMHG | TEMPERATURE: 97.7 F | BODY MASS INDEX: 33.68 KG/M2 | HEIGHT: 66 IN | DIASTOLIC BLOOD PRESSURE: 72 MMHG | WEIGHT: 209.6 LBS | OXYGEN SATURATION: 95 % | RESPIRATION RATE: 16 BRPM | HEART RATE: 63 BPM

## 2018-10-15 DIAGNOSIS — Z23 NEED FOR PROPHYLACTIC VACCINATION AND INOCULATION AGAINST INFLUENZA: ICD-10-CM

## 2018-10-15 DIAGNOSIS — M54.50 ACUTE BILATERAL LOW BACK PAIN WITHOUT SCIATICA: Primary | ICD-10-CM

## 2018-10-15 DIAGNOSIS — Z23 NEED FOR VACCINATION: ICD-10-CM

## 2018-10-15 PROCEDURE — 90686 IIV4 VACC NO PRSV 0.5 ML IM: CPT | Performed by: NURSE PRACTITIONER

## 2018-10-15 PROCEDURE — 90471 IMMUNIZATION ADMIN: CPT | Performed by: NURSE PRACTITIONER

## 2018-10-15 PROCEDURE — 99213 OFFICE O/P EST LOW 20 MIN: CPT | Mod: 25 | Performed by: NURSE PRACTITIONER

## 2018-10-15 NOTE — MR AVS SNAPSHOT
After Visit Summary   10/15/2018    Ry Escobar    MRN: [id number]           Patient Information     Date Of Birth          1970        Visit Information        Provider Department      10/15/2018 9:40 AM Junie Alcazar NP [facility]        Today's Diagnoses     Acute bilateral low back pain without sciatica    -  1       Follow-ups after your visit        Additional Services     [facility] PT, HAND, AND CHIROPRACTIC REFERRAL       Physical Therapy, Hand Therapy and Chiropractic Care are available through:  *[facility]  *Hand Therapy (Occupational Therapy or Physical Therapy)  *[facility]    Call one number to schedule at any of the above locations: [phone].    Physical therapy, Hand therapy and/or Chiropractic care has been recommended by your physician as an excellent treatment option to reduce pain and help people return to normal activities, including sports.  Therapy and/or chiropractic care services are a great complement or alternative to expensive and invasive surgery, injections, or long-term use of prescription medications. The primary goal is to identify the underlying problem and provide you the tools to manage your condition on your own.     Please be aware that coverage of these services is subject to the terms and limitations of your health insurance plan.  Call member services at your health plan with any benefit or coverage questions.      Please bring the following to your appointment:  *Your personal calendar for scheduling future appointments  *Comfortable clothing            ORTHO  REFERRAL       [facility] is referring you to the Orthopedic  Services at [facility].       The  Representative will assist you in the coordination of your Orthopedic and Musculoskeletal Care as prescribed by your physician.    The  Representative will  call you within 1 business day to help schedule your appointment, or you may contact the [facility] Representative at:    All areas ~ [phone]     Type of Referral : Spine: Lumbar: Medical Spine/Non-Surgical Specialist        Timeframe requested: 1 - 2 days    Coverage of these services is subject to the terms and limitations of your health insurance plan.  Please call member services at your health plan with any benefit or coverage questions.      If X-rays, CT or MRI's have been performed, please contact the facility where they were done to arrange for , prior to your scheduled appointment.  Please bring this referral request to your appointment and present it to your specialist.                  Future tests that were ordered for you today     Open Future Orders        Priority Expected Expires Ordered    [facility] PT, HAND, AND CHIROPRACTIC REFERRAL Routine  10/15/2019 10/15/2018            Who to contact     If you have questions or need follow up information about today's clinic visit or your schedule please contact [facility] directly at [phone].  Normal or non-critical lab and imaging results will be communicated to you by [employer], letter or phone within 4 business days after the clinic has received the results. If you do not hear from us within 7 days, please contact the clinic through [employer] or phone. If you have a critical or abnormal lab result, we will notify you by phone as soon as possible.  Submit refill requests through [employer] or call your pharmacy and they will forward the refill request to us. Please allow 3 business days for your refill to be completed.          Additional Information About Your Visit        [employer] Information     [employer] gives you secure access to your electronic health record. If you see a primary care provider, you can also send messages to your care team and make appointments. If you have questions, please call your primary care clinic.  If you  "do not have a primary care provider, please call [phone] and they will assist you.        Care EveryWhere ID     This is your Care EveryWhere ID. This could be used by other organizations to access your [place] medical records  [id number]        Your Vitals Were     Pulse Temperature Respirations Height Pulse Oximetry BMI (Body Mass Index)    63 97.7  F (36.5  C) (Oral) 16 5' 6\" (1.676 m) 95% 33.83 kg/m2       Blood Pressure from Last 3 Encounters:   10/15/18 108/72   10/11/18 123/75   09/27/17 119/82    Weight from Last 3 Encounters:   10/15/18 209 lb 9.6 oz (95.1 kg)   10/11/18 202 lb (91.6 kg)   09/27/17 243 lb 3.2 oz (110.3 kg)              We Performed the Following     ORTHO  REFERRAL          Today's Medication Changes          These changes are accurate as of 10/15/18 10:32 AM.  If you have any questions, ask your nurse or doctor.               Stop taking these medicines if you haven't already. Please contact your care team if you have questions.     tiZANidine 2 MG tablet   Commonly known as:  ZANAFLEX   Stopped by:  Junie Alcazar, ROBINSON                    Primary Care Provider Fax #    Physician No Ref-Primary [phone]       No address on file        Equal Access to Services     [facility]: Hadii khushi kelly hadasho Soeliudali, waaxda luqadaha, qaybta kaalmada adeegyada, cosmo merino . So [facility] [phone].    ATENCIÓN: Si habla español, tiene a llamas disposición servicios gratuitos de asistencia lingüística. Llame al [phone].    We comply with applicable federal civil rights laws and [place] laws. We do not discriminate on the basis of race, color, national origin, age, disability, sex, sexual orientation, or gender identity.            Thank you!     Thank you for choosing [facility]  for your care. Our goal is always to provide you with excellent care. Hearing back from our patients is one way we can continue to improve our services. " Please take a few minutes to complete the written survey that you may receive in the mail after your visit with us. Thank you!             Your Updated Medication List - Protect others around you: Learn how to safely use, store and throw away your medicines at www.disposemymeds.org.          This list is accurate as of 10/15/18 10:32 AM.  Always use your most recent med list.                   Brand Name Dispense Instructions for use Diagnosis    * ibuprofen 600 MG tablet    ADVIL/MOTRIN    50 tablet    Take 1 tablet (600 mg) by mouth every 6 hours as needed for other (For mild pain or temperature greater than 102F)    Multiple transverse process fractures       * ibuprofen 800 MG tablet    ADVIL/MOTRIN    60 tablet    Take 1 tablet (800 mg) by mouth every 8 hours as needed for moderate pain    Bruised ribs, left, subsequent encounter, Lumbar contusion, subsequent encounter       methocarbamol 500 MG tablet    ROBAXIN    30 tablet    Take 2 tablets (1,000 mg) by mouth 3 times daily as needed for muscle spasms        methylPREDNISolone 4 MG tablet    MEDROL DOSEPAK    21 tablet    Follow package instructions        oxyCODONE IR 5 MG tablet    ROXICODONE    12 tablet    Take 1 tablet (5 mg) by mouth every 6 hours as needed for pain        PRILOSEC PO           * Notice:  This list has 2 medication(s) that are the same as other medications prescribed for you. Read the directions carefully, and ask your doctor or other care provider to review them with you.

## 2018-10-15 NOTE — PROGRESS NOTES
SUBJECTIVE:   Ry Escobar is a 48 year old male who presents to clinic today for the following health issues:      ED/UC Followup:    Facility:  [facility]  Date of visit: 10/11/18  Reason for visit: back pain  Current Status: still having pain -5/10 pain scale. L lumbar pain, no radiation. H/o non displaced fracture transverse process L2, L3.  Has tried PT in past, not helpful.  Has been off work.             Problem list and histories reviewed & adjusted, as indicated.  Additional history: as documented    Patient Active Problem List   Diagnosis     Fracture lumbar vertebra-closed (H)     Multiple transverse process fractures     Acute bilateral low back pain without sciatica     History reviewed. No pertinent surgical history.    Social History   Substance Use Topics     Smoking status: Never Smoker     Smokeless tobacco: Never Used     Alcohol use No      Comment: rare     Family History   Problem Relation Age of Onset     Hypertension Mother      Diabetes Father          Current Outpatient Prescriptions   Medication Sig Dispense Refill     ibuprofen (ADVIL,MOTRIN) 600 MG tablet Take 1 tablet (600 mg) by mouth every 6 hours as needed for other (For mild pain or temperature greater than 102F) 50 tablet 0     ibuprofen (ADVIL/MOTRIN) 800 MG tablet Take 1 tablet (800 mg) by mouth every 8 hours as needed for moderate pain 60 tablet 1     methocarbamol (ROBAXIN) 500 MG tablet Take 2 tablets (1,000 mg) by mouth 3 times daily as needed for muscle spasms 30 tablet 1     methylPREDNISolone (MEDROL DOSEPAK) 4 MG tablet Follow package instructions 21 tablet 0     Omeprazole (PRILOSEC PO)        oxyCODONE IR (ROXICODONE) 5 MG tablet Take 1 tablet (5 mg) by mouth every 6 hours as needed for pain 12 tablet 0     BP Readings from Last 3 Encounters:   10/15/18 108/72   10/11/18 123/75   09/27/17 119/82    Wt Readings from Last 3 Encounters:   10/15/18 209 lb 9.6 oz (95.1 kg)   10/11/18 202 lb (91.6 kg)   09/27/17 243 lb 3.2 oz  "(110.3 kg)                    Reviewed and updated as needed this visit by clinical staff  Tobacco  Allergies  Meds  Med Hx  Surg Hx  Fam Hx  Soc Hx      Reviewed and updated as needed this visit by Provider         ROS:  Constitutional, HEENT, cardiovascular, pulmonary, gi and gu systems are negative, except as otherwise noted.    OBJECTIVE:     /72 (BP Location: Right arm, Patient Position: Sitting, Cuff Size: Adult Large)  Pulse 63  Temp 97.7  F (36.5  C) (Oral)  Resp 16  Ht 5' 6\" (1.676 m)  Wt 209 lb 9.6 oz (95.1 kg)  SpO2 95%  BMI 33.83 kg/m2  Body mass index is 33.83 kg/(m^2).  GENERAL: alert, mild discomfort, up on exam table w/o assist.  Comprehensive back pain exam:  Tenderness of L lumbar spine, Pain limits the following motions: all, Lower extremity reflexes within normal limits bilaterally and Straight leg raise negative bilaterally, very tight hamstrings limited SLR        ASSESSMENT/PLAN:               ICD-10-CM    1. Acute bilateral low back pain without sciatica M54.5 ORTHO  REFERRAL     [facility] PT, HAND, AND CHIROPRACTIC REFERRAL   2. Need for vaccination Z23    3. Need for prophylactic vaccination and inoculation against influenza Z23 FLU VACCINE, SPLIT VIRUS, IM (QUADRIVALENT) [97619]- >3 YRS     Vaccine Administration, Initial [72160]       Workability forms to ortho.    Junie Alaczar NP  [facility]    "

## 2018-10-15 NOTE — PROGRESS NOTES

## 2019-10-01 ENCOUNTER — HEALTH MAINTENANCE LETTER (OUTPATIENT)
Age: 49
End: 2019-10-01

## 2020-01-23 ENCOUNTER — ANCILLARY PROCEDURE (OUTPATIENT)
Dept: GENERAL RADIOLOGY | Facility: CLINIC | Age: 50
End: 2020-01-23
Attending: PHYSICIAN ASSISTANT
Payer: OTHER MISCELLANEOUS

## 2020-01-23 ENCOUNTER — OFFICE VISIT (OUTPATIENT)
Dept: URGENT CARE | Facility: URGENT CARE | Age: 50
End: 2020-01-23
Payer: OTHER MISCELLANEOUS

## 2020-01-23 VITALS
OXYGEN SATURATION: 100 % | DIASTOLIC BLOOD PRESSURE: 81 MMHG | HEART RATE: 80 BPM | TEMPERATURE: 98.1 F | WEIGHT: 250 LBS | SYSTOLIC BLOOD PRESSURE: 136 MMHG | BODY MASS INDEX: 40.35 KG/M2

## 2020-01-23 DIAGNOSIS — S99.912A ANKLE INJURY, LEFT, INITIAL ENCOUNTER: Primary | ICD-10-CM

## 2020-01-23 DIAGNOSIS — E66.01 MORBID OBESITY (H): ICD-10-CM

## 2020-01-23 DIAGNOSIS — S93.402A SPRAIN OF LEFT ANKLE, UNSPECIFIED LIGAMENT, INITIAL ENCOUNTER: ICD-10-CM

## 2020-01-23 PROCEDURE — 73610 X-RAY EXAM OF ANKLE: CPT | Mod: LT

## 2020-01-23 PROCEDURE — 99214 OFFICE O/P EST MOD 30 MIN: CPT | Performed by: PHYSICIAN ASSISTANT

## 2020-01-23 RX ORDER — GABAPENTIN 600 MG/1
400 TABLET ORAL EVERY 6 HOURS
COMMUNITY
Start: 2019-12-22

## 2020-01-23 RX ORDER — CYCLOBENZAPRINE HCL 10 MG
TABLET ORAL
COMMUNITY
Start: 2019-12-26

## 2020-01-23 ASSESSMENT — ENCOUNTER SYMPTOMS
CARDIOVASCULAR NEGATIVE: 1
ALLERGIC/IMMUNOLOGIC NEGATIVE: 1
NEUROLOGICAL NEGATIVE: 1
ENDOCRINE NEGATIVE: 1
EYES NEGATIVE: 1
RESPIRATORY NEGATIVE: 1
GASTROINTESTINAL NEGATIVE: 1
CONSTITUTIONAL NEGATIVE: 1
PSYCHIATRIC NEGATIVE: 1
HEMATOLOGIC/LYMPHATIC NEGATIVE: 1
ARTHRALGIAS: 1

## 2020-01-23 NOTE — PROGRESS NOTES
SUBJECTIVE:   Ry Escobar is a 49 year old male presenting with a chief complaint of   Chief Complaint   Patient presents with     Urgent Care     Work Comp     hurt left ankle at work. happened lastnight at 830        He is an established patient of [facility].  Patient wearing boots injured is left ankle yesterday while stepping down.  No prior injury to ankle.  Patient took 400 mg ibuprofen and ice salvador.  4 hours later 400 mg ibuprofen, repeated.  Patient unable to put weight on.  Patient saw [facility] PTA and sent here for xray and cam walker.   Patient was sent for xray but was closed and patient is here, with an order for cam walker and xray.      Patient takes gabapentin daily, flexeril every day for radiculopathy.      MS Injury/Pain    Onset of symptoms was 1 day(s) ago.  Location: left ankle  Context:       The injury happened while at work      Mechanism: lifting      Patient experienced immediate pain  Course of symptoms is same.    Severity moderate  Current and Associated symptoms: Pain and Swelling  Denies  Warmth and Redness  Aggravating Factors: weight-bearing  Therapies to improve symptoms include: ice and ibuprofen  This is the first time this type of problem has occurred for this patient.       Review of Systems   Constitutional: Negative.    HENT: Negative.    Eyes: Negative.    Respiratory: Negative.    Cardiovascular: Negative.    Gastrointestinal: Negative.    Endocrine: Negative.    Genitourinary: Negative.    Musculoskeletal: Positive for arthralgias.   Allergic/Immunologic: Negative.    Neurological: Negative.    Hematological: Negative.    Psychiatric/Behavioral: Negative.    All other systems reviewed and are negative.      Past Medical History:   Diagnosis Date     Gastro-oesophageal reflux disease      Kidney stone      Family History   Problem Relation Age of Onset     Hypertension Mother      Diabetes Father      Current Outpatient Medications   Medication Sig Dispense Refill      ibuprofen (ADVIL/MOTRIN) 800 MG tablet Take 1 tablet (800 mg) by mouth every 8 hours as needed for moderate pain 60 tablet 1     Omeprazole (PRILOSEC PO)        cyclobenzaprine (FLEXERIL) 10 MG tablet        gabapentin (NEURONTIN) 600 MG tablet        ibuprofen (ADVIL,MOTRIN) 600 MG tablet Take 1 tablet (600 mg) by mouth every 6 hours as needed for other (For mild pain or temperature greater than 102F) (Patient not taking: Reported on 1/23/2020) 50 tablet 0     methocarbamol (ROBAXIN) 500 MG tablet Take 2 tablets (1,000 mg) by mouth 3 times daily as needed for muscle spasms (Patient not taking: Reported on 1/23/2020) 30 tablet 1     methylPREDNISolone (MEDROL DOSEPAK) 4 MG tablet Follow package instructions (Patient not taking: Reported on 1/23/2020) 21 tablet 0     oxyCODONE IR (ROXICODONE) 5 MG tablet Take 1 tablet (5 mg) by mouth every 6 hours as needed for pain (Patient not taking: Reported on 1/23/2020) 12 tablet 0     Social History     Tobacco Use     Smoking status: Never Smoker     Smokeless tobacco: Never Used   Substance Use Topics     Alcohol use: No     Comment: rare       OBJECTIVE  /81   Pulse 80   Temp 98.1  F (36.7  C) (Oral)   Wt 113.4 kg (250 lb)   SpO2 100%   BMI 40.35 kg/m      Physical Exam  Vitals signs and nursing note reviewed.   Constitutional:       Appearance: He is obese.   Eyes:      Extraocular Movements: Extraocular movements intact.      Conjunctiva/sclera: Conjunctivae normal.   Cardiovascular:      Rate and Rhythm: Normal rate.   Musculoskeletal:      Comments: Decreased ROM on all planes. DP present.  Edema and tenderness to palpation of lateral malleolus.  Digit ROM normal.     Skin:     General: Skin is warm and dry.      Capillary Refill: Capillary refill takes less than 2 seconds.   Neurological:      General: No focal deficit present.      Mental Status: He is alert.   Psychiatric:         Mood and Affect: Mood normal.         Labs:  No results found for this or  any previous visit (from the past 24 hour(s)).    X-Ray was done, my findings are: no fx.  Xrays personally viewed by myself.      ASSESSMENT:      ICD-10-CM    1. Ankle injury, left, initial encounter S99.912A XR Ankle Left G/E 3 Views     CANCELED: XR Ankle Left G/E 3 Views   2. Morbid obesity (H) E66.01    3. Sprain of left ankle, unspecified ligament, initial encounter S93.402A SHORT CAM WALKER     Crutches Order for DME - ONLY FOR DME        Medical Decision Making:    Differential Diagnosis:  MS Injury Pain: sprain and fracture    Serious Comorbid Conditions:  Adult:  None    PLAN:    MS Injury/Pain  ice and Ibuprofen    Followup:    Patient placed in cam walker and he has walker with him to ambulate.    If not improving or if condition worsens, follow up with your Primary Care Provider, If not improving or if conditions worsens over the next 12-24 hours, go to the Emergency Department.    Patient placed in short cam walker and given crutches.  Patient has f/u appointment with [facility] on Monday and PT set up for him.  Patient state he has plenty of pain medication at home.      Patient Instructions       Patient Education     Understanding Ankle Sprain    The ankle is the joint where the leg and foot meet. Bones are held in place by connective tissue called ligaments. When ankle ligaments are stretched to the point of pain and injury, it is called an ankle sprain. A sprain can tear the ligaments. These tears can be very small but still cause pain. Ankle sprains can be mild or severe.  What causes an ankle sprain?  A sprain may occur when you twist your ankle or bend it too far. This can happen when you stumble or fall. Things that can make an ankle sprain more likely include:    Having had an ankle sprain before    Playing sports that involve running and jumping. Or playing contact sports such as football or hockey.    Wearing shoes that don t support your feet and ankles well    Having ankles with poor  strength and flexibility  Symptoms of an ankle sprain  Symptoms may include:    Pain or soreness in the ankle    Swelling    Redness or bruising    Not being able to walk or put weight on the affected foot    Reduced range of motion in the ankle    A popping or tearing feeling at the time the sprain occurs    An abnormal or dislocated look to the ankle    Instability or too much range of motion in the ankle  Treatment for an ankle sprain  Treatment focuses on reducing pain and swelling, and avoiding further injury. Treatments may include:    Resting the ankle. Avoid putting weight on it. This may mean using crutches until the sprain heals.    Prescription or over-the-counter pain medicines. These help reduce swelling and pain.    Cold packs. These help reduce pain and swelling.    Raising your ankle above your heart. This helps reduce swelling.    Wrapping the ankle with an elastic bandage or ankle brace. This helps reduce swelling and gives some support to the ankle. In rare cases, you may need a cast or boot.    Stretching and other exercises. These improve flexibility and strength.    Heat packs. These may be recommended before doing ankle exercises.  Possible complications of an ankle sprain  An ankle that has been weakened by a sprain can be more likely to have repeated sprains afterward. Doing exercises to strengthen your ankle and improve balance can reduce your risk for repeated sprains. Other possible complications are long-term (chronic) pain or an ankle that remains unstable.  When to call your healthcare provider  Call your healthcare provider right away if you have any of these:    Fever of 100.4 F (38 C) or higher, or as directed    Pain, numbness, discoloration, or coldness in the foot or toes    Pain that gets worse    Symptoms that don t get better, or get worse    New symptoms   Date Last Reviewed: 3/10/2016    [id number] [employer]. [address] [facility], [place] [address]. All  rights reserved. This information is not intended as a substitute for professional medical care. Always follow your healthcare professional's instructions.

## 2020-01-24 NOTE — PATIENT INSTRUCTIONS
Patient Education     Understanding Ankle Sprain    The ankle is the joint where the leg and foot meet. Bones are held in place by connective tissue called ligaments. When ankle ligaments are stretched to the point of pain and injury, it is called an ankle sprain. A sprain can tear the ligaments. These tears can be very small but still cause pain. Ankle sprains can be mild or severe.  What causes an ankle sprain?  A sprain may occur when you twist your ankle or bend it too far. This can happen when you stumble or fall. Things that can make an ankle sprain more likely include:    Having had an ankle sprain before    Playing sports that involve running and jumping. Or playing contact sports such as football or hockey.    Wearing shoes that don t support your feet and ankles well    Having ankles with poor strength and flexibility  Symptoms of an ankle sprain  Symptoms may include:    Pain or soreness in the ankle    Swelling    Redness or bruising    Not being able to walk or put weight on the affected foot    Reduced range of motion in the ankle    A popping or tearing feeling at the time the sprain occurs    An abnormal or dislocated look to the ankle    Instability or too much range of motion in the ankle  Treatment for an ankle sprain  Treatment focuses on reducing pain and swelling, and avoiding further injury. Treatments may include:    Resting the ankle. Avoid putting weight on it. This may mean using crutches until the sprain heals.    Prescription or over-the-counter pain medicines. These help reduce swelling and pain.    Cold packs. These help reduce pain and swelling.    Raising your ankle above your heart. This helps reduce swelling.    Wrapping the ankle with an elastic bandage or ankle brace. This helps reduce swelling and gives some support to the ankle. In rare cases, you may need a cast or boot.    Stretching and other exercises. These improve flexibility and strength.    Heat packs. These may be  recommended before doing ankle exercises.  Possible complications of an ankle sprain  An ankle that has been weakened by a sprain can be more likely to have repeated sprains afterward. Doing exercises to strengthen your ankle and improve balance can reduce your risk for repeated sprains. Other possible complications are long-term (chronic) pain or an ankle that remains unstable.  When to call your healthcare provider  Call your healthcare provider right away if you have any of these:    Fever of 100.4 F (38 C) or higher, or as directed    Pain, numbness, discoloration, or coldness in the foot or toes    Pain that gets worse    Symptoms that don t get better, or get worse    New symptoms   Date Last Reviewed: 3/10/2016    [id number] [employer]. [address] [place] [address], [place] [address]. All rights reserved. This information is not intended as a substitute for professional medical care. Always follow your healthcare professional's instructions.

## 2021-01-15 ENCOUNTER — HEALTH MAINTENANCE LETTER (OUTPATIENT)
Age: 51
End: 2021-01-15

## 2021-05-27 ENCOUNTER — RECORDS - HEALTHEAST (OUTPATIENT)
Dept: ADMINISTRATIVE | Facility: CLINIC | Age: 51
End: 2021-05-27

## 2021-05-28 ENCOUNTER — RECORDS - HEALTHEAST (OUTPATIENT)
Dept: ADMINISTRATIVE | Facility: CLINIC | Age: 51
End: 2021-05-28

## 2021-09-04 ENCOUNTER — HEALTH MAINTENANCE LETTER (OUTPATIENT)
Age: 51
End: 2021-09-04

## 2022-02-19 ENCOUNTER — HEALTH MAINTENANCE LETTER (OUTPATIENT)
Age: 52
End: 2022-02-19

## 2022-10-16 ENCOUNTER — HEALTH MAINTENANCE LETTER (OUTPATIENT)
Age: 52
End: 2022-10-16

## 2023-02-15 ENCOUNTER — HOSPITAL ENCOUNTER (EMERGENCY)
Facility: CLINIC | Age: 53
Discharge: HOME | End: 2023-02-15
Attending: PHYSICIAN ASSISTANT | Admitting: PHYSICIAN ASSISTANT
Payer: COMMERCIAL

## 2023-02-15 VITALS
DIASTOLIC BLOOD PRESSURE: 78 MMHG | SYSTOLIC BLOOD PRESSURE: 130 MMHG | HEART RATE: 81 BPM | OXYGEN SATURATION: 98 % | TEMPERATURE: 98.1 F | RESPIRATION RATE: 18 BRPM

## 2023-02-15 DIAGNOSIS — M54.42 ACUTE LEFT-SIDED LOW BACK PAIN WITH LEFT-SIDED SCIATICA: ICD-10-CM

## 2023-02-15 PROCEDURE — 250N000011 HC RX IP 250 OP 636: Performed by: PHYSICIAN ASSISTANT

## 2023-02-15 PROCEDURE — 99284 EMERGENCY DEPT VISIT MOD MDM: CPT

## 2023-02-15 PROCEDURE — 96372 THER/PROPH/DIAG INJ SC/IM: CPT | Mod: 59 | Performed by: PHYSICIAN ASSISTANT

## 2023-02-15 PROCEDURE — 250N000012 HC RX MED GY IP 250 OP 636 PS 637: Performed by: PHYSICIAN ASSISTANT

## 2023-02-15 PROCEDURE — 250N000013 HC RX MED GY IP 250 OP 250 PS 637: Performed by: PHYSICIAN ASSISTANT

## 2023-02-15 RX ORDER — KETOROLAC TROMETHAMINE 10 MG/1
10 TABLET, FILM COATED ORAL EVERY 6 HOURS PRN
Qty: 20 TABLET | Refills: 0 | Status: SHIPPED | OUTPATIENT
Start: 2023-02-15 | End: 2023-02-20

## 2023-02-15 RX ORDER — KETOROLAC TROMETHAMINE 15 MG/ML
15 INJECTION, SOLUTION INTRAMUSCULAR; INTRAVENOUS ONCE
Status: COMPLETED | OUTPATIENT
Start: 2023-02-15 | End: 2023-02-15

## 2023-02-15 RX ORDER — PREDNISONE 20 MG/1
40 TABLET ORAL ONCE
Status: COMPLETED | OUTPATIENT
Start: 2023-02-15 | End: 2023-02-15

## 2023-02-15 RX ORDER — LIDOCAINE 4 G/G
2 PATCH TOPICAL ONCE
Status: DISCONTINUED | OUTPATIENT
Start: 2023-02-15 | End: 2023-02-15 | Stop reason: HOSPADM

## 2023-02-15 RX ORDER — METHYLPREDNISOLONE 4 MG
TABLET, DOSE PACK ORAL
Qty: 21 TABLET | Refills: 0 | Status: SHIPPED | OUTPATIENT
Start: 2023-02-15

## 2023-02-15 RX ORDER — LIDOCAINE 4 G/G
2 PATCH TOPICAL EVERY 24 HOURS
Qty: 10 PATCH | Refills: 0 | Status: SHIPPED | OUTPATIENT
Start: 2023-02-15 | End: 2023-02-20

## 2023-02-15 RX ADMIN — LIDOCAINE PATCH 4% 2 PATCH: 40 PATCH TOPICAL at 21:03

## 2023-02-15 RX ADMIN — PREDNISONE 40 MG: 20 TABLET ORAL at 21:03

## 2023-02-15 RX ADMIN — KETOROLAC TROMETHAMINE 15 MG: 15 INJECTION, SOLUTION INTRAMUSCULAR; INTRAVENOUS at 21:03

## 2023-02-15 ASSESSMENT — ACTIVITIES OF DAILY LIVING (ADL): ADLS_ACUITY_SCORE: 35

## 2023-02-15 NOTE — Clinical Note
Ry Escobar was seen and treated in our emergency department on 2/15/2023.  He may return to work on 02/20/2023.       If you have any questions or concerns, please don't hesitate to call.      Federico Yao PA-C

## 2023-02-16 NOTE — ED PROVIDER NOTES
History     Chief Complaint:  Back Pain       HPI   Ry Escobar is a 52 year old male who presents with acute onset of low back pain with left leg radiculopathy that started earlier today when he was bending down around a corner and felt a zinging of pain and progressively got worse now shooting down his left leg. He sees a chronic pain clinic and has a spine stimulator.  He is taking his normal doses of pain medicine including hydrocodone and gabapentin and Robaxin earlier this morning. He denies any lower extremity weakness or saddle anesthesia.  He reports he has not tried to urinate since this happened but he feels like he probably could.  Denies abdominal pain, chest pain, fevers, SOB, nausea or vomiting.  He has a chronic history of low back pain with history of Minimally invasive /Tubular-assisted hemilaminotomy and subarticular decompression, L5-S1 left about 5 years ago. After being diagnosed with lumbosacral disc herniation with left leg radiculopathy with [facility] Note 2/15/19.    ROS:  Review of Systems  Per HPI    Allergies:  Tussin Pediatric [Robitussin Childrens Cough]     Medications:    ketorolac (TORADOL) 10 MG tablet  Lidocaine (LIDOCARE) 4 % Patch  methylPREDNISolone (MEDROL DOSEPAK) 4 MG tablet therapy pack  cyclobenzaprine (FLEXERIL) 10 MG tablet  gabapentin (NEURONTIN) 600 MG tablet  ibuprofen (ADVIL,MOTRIN) 600 MG tablet  ibuprofen (ADVIL/MOTRIN) 800 MG tablet  methocarbamol (ROBAXIN) 500 MG tablet  Omeprazole (PRILOSEC PO)  oxyCODONE IR (ROXICODONE) 5 MG tablet        Past Medical History:    Past Medical History:   Diagnosis Date     Gastro-oesophageal reflux disease      Kidney stone      Patient Active Problem List   Diagnosis     Fracture lumbar vertebra-closed (H)     Multiple transverse process fractures     Acute bilateral low back pain without sciatica     Morbid obesity (H)        Past Surgical History:    No past surgical history on file.     Family History:     family history includes Diabetes in his father; Hypertension in his mother.    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   Constitutional:       Appearance: He is not diaphoretic.   Eyes:      General: No scleral icterus.     Conjunctiva/sclera: Conjunctivae normal.   Cardiovascular:      Rate and Rhythm: Normal rate and regular rhythm.      Heart sounds: Normal heart sounds. No murmur heard.    No gallop.   Pulmonary:      Effort: Pulmonary effort is normal. No respiratory distress.      Breath sounds: No stridor. No wheezing, rhonchi or rales.   Abdominal:      Palpations: Abdomen is soft. There is no mass or pulsatile mass.      Tenderness: There is no abdominal tenderness.   Musculoskeletal:      Thoracic back: Normal. No spasms, tenderness or bony tenderness. Normal range of motion.      Lumbar back: Tenderness present. No deformity. Decreased range of motion. Positive left straight leg raise test. Negative right straight leg raise test.        Back:    Skin:     General: Skin is warm.      Coloration: Skin is not jaundiced or pale.      Findings: No bruising, ecchymosis or erythema.   Neurological:      Mental Status: He is alert. Mental status is at baseline.      Sensory: Sensation is intact. No sensory deficit.      Motor: Motor function is intact. No weakness.      Comments: Has Full ROM and strength of feet/ankle, knees, hips.   Psychiatric:         Mood and Affect: Mood normal.         Behavior: Behavior normal.         Thought Content: Thought content normal.           Emergency Department Course     Imaging:  No orders to display      Laboratory:  Labs Ordered and Resulted from Time of ED Arrival to Time of ED  Departure - No data to display     Procedures     Emergency Department Course:         Reviewed:  I reviewed nursing notes, vitals and past medical history    Assessments/Consults:   ED Course as of 02/16/23 1032   Wed Feb 15, 2023   2130 Patient reports improvement of back pain with interventions.    He was able to urinate normally during ED visit without evidence of hematuria or dysuria         Interventions:  Medications   ketorolac (TORADOL) injection 15 mg (15 mg Intramuscular Given 2/15/23 2103)   predniSONE (DELTASONE) tablet 40 mg (40 mg Oral Given 2/15/23 2103)      Disposition:  The patient was discharged to home.     Impression & Plan    CMS Diagnoses: None    Medical Decision Making:    Ry Escobar is a 52 year old male who presents for evaluation of back pain that started after bending today.  He has known history of chronic back pain in the past which included left leg radiculopathy. Pain has improved with interventions in the emergency department. The patient did not sustain any trauma, therefore x-rays are not necessary due to the low likelihood of fracture or subluxation.  No red flag symptoms to suggest CT and/or MRI is indicated at this point.  There is no clinical evidence of cauda equina syndrome, discitis, spinal/epidural space hematoma or epidural abscess or other worrisome etiology. The neurological exam is normal and the patient's symptoms seem consistent with musculoskeletal issues. Possible herniated disc without central cord involvement. He was able to urinate normally here in the ED. No further objective findings or history for concerning intraabdominal pathology such as AAA, kidney stones. Slight elevation in his blood pressure likely due to pain as this improved with pain improvement.  The patient will be discharged with pain medications to use as directed. Ice or heat to the back and stretching exercises. No heavy lifting, bending or twisting. Return if increasing pain, numbness,  weakness, or bowel or bladder dysfunction. He was advised to schedule follow-up with his primary doctor or pain doctor within 5 days to re-assess symptoms.    My impression of today's diagnosis is:     ICD-10-CM    1. Acute left-sided low back pain with left-sided sciatica  M54.42         Discharge Medications:  Discharge Medication List as of 2/15/2023  9:37 PM      START taking these medications    Details   ketorolac (TORADOL) 10 MG tablet Take 1 tablet (10 mg) by mouth every 6 hours as needed for moderate pain (4-6) (with food), Disp-20 tablet, R-0, Local Print      Lidocaine (LIDOCARE) 4 % Patch Place 2 patches onto the skin every 24 hours for 5 days To prevent lidocaine toxicity, patient should be patch free for 12 hrs daily.Disp-10 patch, R-0Local Print          methylPREDNISolone (MEDROL DOSEPAK) 4 MG tablet therapy pack  Follow Package Directions, Disp-21 tablet, R-0, Local Print, Refills: 0 ordered, Ordered by: Federico Yao PA-C    2/15/2023   Federico Yao PA-C Kruger, Jacob C, PA-C  02/16/23 1032

## 2023-02-16 NOTE — ED TRIAGE NOTES
Patient states he is having left lower back pain since this morning.  Patient states he is taking Hydrocodone, and  gabapentin for pain which he has for chronic back pain.      Triage Assessment     Row Name 02/15/23 9835       Triage Assessment (Adult)    Airway WDL WDL       Respiratory WDL    Respiratory WDL WDL       Skin Circulation/Temperature WDL    Skin Circulation/Temperature WDL WDL       Cardiac WDL    Cardiac WDL WDL       Peripheral/Neurovascular WDL    Peripheral Neurovascular WDL WDL       Cognitive/Neuro/Behavioral WDL    Cognitive/Neuro/Behavioral WDL WDL

## 2023-04-01 ENCOUNTER — HEALTH MAINTENANCE LETTER (OUTPATIENT)
Age: 53
End: 2023-04-01

## 2024-02-01 ENCOUNTER — TRANSCRIBE ORDERS (OUTPATIENT)
Dept: OTHER | Age: 54
End: 2024-02-01

## 2024-02-01 DIAGNOSIS — E66.9 OBESITY, UNSPECIFIED: Primary | ICD-10-CM

## 2024-03-01 NOTE — PROGRESS NOTES
SUBJECTIVE:   Ry Escobar is a 47 year old male who presents to clinic today for the following health issues:      ED/UC Followup:    Facility:   Emergency Department  Date of visit: 9/1/17  Reason for visit: Back pain  Current Status: Improved, but still having pain periodically    Back Subjective:         Symptoms began:   3 day(s) ago       Symptoms changing:  onset acute and onset fell into a machine at work and contused his left hip and ribs  and are just a little better .                  Location:  middle of back left and hip left region       Radiation to does not radiate       At worst a 9 on a scale of 1-10.         Personal hx of back pain is:  previous osteoarthritis of lumbar spine.       Pain is exacerbated by: coughing, walking, sitting and changing position.       Pain is relieved by: heat, ice and rest.       Associated sx include: none.       Previous plain films obtained: No.        Results: reviewed radiologist.       Red flag symptoms: negative no hematuria    (S20.165D) Bruised ribs, left, subsequent encounter  (primary encounter diagnosis)  Comment:   Plan: oxyCODONE-acetaminophen (PERCOCET) 5-325 MG per        tablet, tiZANidine (ZANAFLEX) 2 MG tablet          Off work see letter, follow up four days     .         - - -

## 2024-06-01 ENCOUNTER — HEALTH MAINTENANCE LETTER (OUTPATIENT)
Age: 54
End: 2024-06-01

## 2024-06-06 NOTE — PROGRESS NOTES
ASSESSMENT & PLAN  Patient Instructions     1. Trigger thumb of left hand    2. Trigger middle finger of left hand    3. Trigger ring finger of left hand      -Patient has chronic left hand pain due to thumb third and fourth digit due to trigger fingers  -Patient has longstanding history of multiple trigger fingers, surgeries and injections.  Patient had a left thumb A1 pulley cortisone injection approximately 3 months ago which provided only 2 months of relief  -Patient was requesting a repeat thumb A1 pulley cortisone injection today.  Patient was advised that he is not getting long-term treatment and should consider surgical intervention in the near future  -Patient is also having clicking and pain at the IP joint.  -Patient tolerated left thumb A1 and A2 pulley cortisone injections today without complications.  Patient was given postprocedure instructions  -Patient will be seen by hand therapy to mold custom finger splints for the thumb third and fourth digits  -Patient will call us if pain returns within a few months for referral to hand surgery  -Call direct clinic number [[phone]] at any time with questions or concerns.    Albert Yeo MD [facility] Medicine  [facility]        -----    SUBJECTIVE  Ry Escobar is a/an 54 year old Right handed male who is seen as a self referral for evaluation of left hand pain. The patient is seen by themselves.    Onset: 1 years(s) ago. Reports insidious onset without acute precipitating event.  Location of Pain: left thumb  Rating of Pain at worst: 7/10  Rating of Pain Currently: 3/10  Worsened by: bending finger, full extension  Better with: cortisone injection  Treatments tried: rest/activity avoidance and corticosteroid injection (most recent date: ~ 3 months ago at [facility]) that provided  3 month(s) of relief  Associated symptoms: locking or catching  Orthopedic history: YES - h/o chronic back pain  Relevant  "surgical history: NO  Social history: works at manual labor    Past Medical History:   Diagnosis Date    Gastro-oesophageal reflux disease     Kidney stone      Social History     Socioeconomic History    Marital status:    Tobacco Use    Smoking status: Never    Smokeless tobacco: Never   Substance and Sexual Activity    Alcohol use: No     Comment: rare    Drug use: No    Sexual activity: Never         Patient's past medical, surgical, social, and family histories were reviewed today and no changes are noted.    REVIEW OF SYSTEMS:  10 point ROS is negative other than symptoms noted above in HPI, Past Medical History or as stated below  Constitutional: NEGATIVE for fever, chills, change in weight  Skin: NEGATIVE for worrisome rashes, moles or lesions  GI/: NEGATIVE for bowel or bladder changes  Neuro: NEGATIVE for weakness, dizziness or paresthesias    OBJECTIVE:  BP (!) 146/93   Ht 1.676 m (5' 6\")   Wt 115.7 kg (255 lb)   BMI 41.16 kg/m     General: healthy, alert and in no distress  HEENT: no scleral icterus or conjunctival erythema  Skin: no suspicious lesions or rash. No jaundice.  CV: regular rhythm by palpation  Resp: normal respiratory effort without conversational dyspnea   Psych: normal mood and affect  Gait: normal steady gait with appropriate coordination and balance  Neuro: normal light touch sensory exam of the bilateral hands.    MSK:  LEFT HAND  Inspection:    No swelling or obvious deformity or asymmetry  Palpation:   Carpals: normal   Metacarpals: normal   Thumb: MCP joint, PIP joint, triggering   Fingers: A1 Pulley  Range of Motion:    Full active flexion and extension at MCP, PIP, and DIP joints; normal finger cascade without malrotation.  Wrist pronation, supination, and ulnar/radial deviation normal.  Strength:  Grossly intact  Special Tests:    Positive: palpable triggering thumb    Negative: flexor digitorum superficialis testing, flexor digitorum profundus testing    Independent " visualization of the below image:  No results found for this or any previous visit (from the past 24 hour(s)).    Hand / Upper Extremity Injection/Arthrocentesis: L thumb A1    Date/Time: 6/7/2024 3:39 PM    Performed by: Yeo, Albert, MD  Authorized by: Yeo, Albert, MD    Indications:  Pain, tendon swelling and therapeutic  Needle Size:  25 G  Guidance: ultrasound    Approach:  Volar  Condition: trigger finger    Location:  Thumb    Site:  L thumb A1  Medications:  40 mg methylPREDNISolone 40 MG/ML; 1 mL lidocaine 1 %  Outcome:  Tolerated well, no immediate complications  Procedure discussed: discussed risks, benefits, and alternatives    Consent Given by:  Patient  Timeout: timeout called immediately prior to procedure    Prep: patient was prepped and draped in usual sterile fashion     Ultrasound was used to ensure safe and accurate needle placement and injection. Ultrasound images of the procedure were permanently stored.          Albert Yeo MD [facility]

## 2024-06-07 ENCOUNTER — OFFICE VISIT (OUTPATIENT)
Dept: ORTHOPEDICS | Facility: CLINIC | Age: 54
End: 2024-06-07
Payer: COMMERCIAL

## 2024-06-07 VITALS
HEIGHT: 66 IN | SYSTOLIC BLOOD PRESSURE: 146 MMHG | BODY MASS INDEX: 40.98 KG/M2 | WEIGHT: 255 LBS | DIASTOLIC BLOOD PRESSURE: 93 MMHG

## 2024-06-07 DIAGNOSIS — M65.342 TRIGGER RING FINGER OF LEFT HAND: ICD-10-CM

## 2024-06-07 DIAGNOSIS — M65.332 TRIGGER MIDDLE FINGER OF LEFT HAND: ICD-10-CM

## 2024-06-07 DIAGNOSIS — M65.312 TRIGGER THUMB OF LEFT HAND: Primary | ICD-10-CM

## 2024-06-07 PROCEDURE — 76942 ECHO GUIDE FOR BIOPSY: CPT | Performed by: FAMILY MEDICINE

## 2024-06-07 PROCEDURE — 99203 OFFICE O/P NEW LOW 30 MIN: CPT | Mod: 25 | Performed by: FAMILY MEDICINE

## 2024-06-07 PROCEDURE — 20550 NJX 1 TENDON SHEATH/LIGAMENT: CPT | Mod: FA | Performed by: FAMILY MEDICINE

## 2024-06-07 RX ORDER — METHYLPREDNISOLONE ACETATE 40 MG/ML
40 INJECTION, SUSPENSION INTRA-ARTICULAR; INTRALESIONAL; INTRAMUSCULAR; SOFT TISSUE
Status: SHIPPED | OUTPATIENT
Start: 2024-06-07

## 2024-06-07 RX ORDER — HYDROCODONE BITARTRATE AND ACETAMINOPHEN 5; 325 MG/1; MG/1
1 TABLET ORAL EVERY 6 HOURS PRN
COMMUNITY

## 2024-06-07 RX ORDER — LIDOCAINE HYDROCHLORIDE 10 MG/ML
1 INJECTION, SOLUTION INFILTRATION; PERINEURAL
Status: SHIPPED | OUTPATIENT
Start: 2024-06-07

## 2024-06-07 RX ADMIN — METHYLPREDNISOLONE ACETATE 40 MG: 40 INJECTION, SUSPENSION INTRA-ARTICULAR; INTRALESIONAL; INTRAMUSCULAR; SOFT TISSUE at 15:39

## 2024-06-07 RX ADMIN — LIDOCAINE HYDROCHLORIDE 1 ML: 10 INJECTION, SOLUTION INFILTRATION; PERINEURAL at 15:39

## 2024-06-07 NOTE — LETTER
6/7/2024      Ry Escobar  [address] [place] [address]      Dear Colleague,    Thank you for referring your patient, Ry Escobar, to the [facility] [place]. Please see a copy of my visit note below.    ASSESSMENT & PLAN  Patient Instructions     1. Trigger thumb of left hand    2. Trigger middle finger of left hand    3. Trigger ring finger of left hand      -Patient has chronic left hand pain due to thumb third and fourth digit due to trigger fingers  -Patient has longstanding history of multiple trigger fingers, surgeries and injections.  Patient had a left thumb A1 pulley cortisone injection approximately 3 months ago which provided only 2 months of relief  -Patient was requesting a repeat thumb A1 pulley cortisone injection today.  Patient was advised that he is not getting long-term treatment and should consider surgical intervention in the near future  -Patient is also having clicking and pain at the IP joint.  -Patient tolerated left thumb A1 and A2 pulley cortisone injections today without complications.  Patient was given postprocedure instructions  -Patient will be seen by hand therapy to mold custom finger splints for the thumb third and fourth digits  -Patient will call us if pain returns within a few months for referral to hand surgery  -Call direct clinic number [[phone]] at any time with questions or concerns.    Albert Yeo MD [facility] Orthopedics and Sports Medicine  [facility]        -----    SUBJECTIVE  Ry Escobar is a/an 54 year old Right handed male who is seen as a self referral for evaluation of left hand pain. The patient is seen by themselves.    Onset: 1 years(s) ago. Reports insidious onset without acute precipitating event.  Location of Pain: left thumb  Rating of Pain at worst: 7/10  Rating of Pain Currently: 3/10  Worsened by: bending finger, full extension  Better with: cortisone  "injection  Treatments tried: rest/activity avoidance and corticosteroid injection (most recent date: ~ 3 months ago at [facility]) that provided  3 month(s) of relief  Associated symptoms: locking or catching  Orthopedic history: YES - h/o chronic back pain  Relevant surgical history: NO  Social history: works at manual labor    Past Medical History:   Diagnosis Date     Gastro-oesophageal reflux disease      Kidney stone      Social History     Socioeconomic History     Marital status:    Tobacco Use     Smoking status: Never     Smokeless tobacco: Never   Substance and Sexual Activity     Alcohol use: No     Comment: rare     Drug use: No     Sexual activity: Never         Patient's past medical, surgical, social, and family histories were reviewed today and no changes are noted.    REVIEW OF SYSTEMS:  10 point ROS is negative other than symptoms noted above in HPI, Past Medical History or as stated below  Constitutional: NEGATIVE for fever, chills, change in weight  Skin: NEGATIVE for worrisome rashes, moles or lesions  GI/: NEGATIVE for bowel or bladder changes  Neuro: NEGATIVE for weakness, dizziness or paresthesias    OBJECTIVE:  BP (!) 146/93   Ht 1.676 m (5' 6\")   Wt 115.7 kg (255 lb)   BMI 41.16 kg/m     General: healthy, alert and in no distress  HEENT: no scleral icterus or conjunctival erythema  Skin: no suspicious lesions or rash. No jaundice.  CV: regular rhythm by palpation  Resp: normal respiratory effort without conversational dyspnea   Psych: normal mood and affect  Gait: normal steady gait with appropriate coordination and balance  Neuro: normal light touch sensory exam of the bilateral hands.    MSK:  LEFT HAND  Inspection:    No swelling or obvious deformity or asymmetry  Palpation:   Carpals: normal   Metacarpals: normal   Thumb: MCP joint, PIP joint, triggering   Fingers: A1 Pulley  Range of Motion:    Full active flexion and extension at MCP, PIP, and DIP joints; normal finger " cascade without malrotation.  Wrist pronation, supination, and ulnar/radial deviation normal.  Strength:  Grossly intact  Special Tests:    Positive: palpable triggering thumb    Negative: flexor digitorum superficialis testing, flexor digitorum profundus testing    Independent visualization of the below image:  No results found for this or any previous visit (from the past 24 hour(s)).    Hand / Upper Extremity Injection/Arthrocentesis: L thumb A1    Date/Time: 6/7/2024 3:39 PM    Performed by: Yeo, Albert, MD  Authorized by: Yeo, Albert, MD    Indications:  Pain, tendon swelling and therapeutic  Needle Size:  25 G  Guidance: ultrasound    Approach:  Volar  Condition: trigger finger    Location:  Thumb    Site:  L thumb A1  Medications:  40 mg methylPREDNISolone 40 MG/ML; 1 mL lidocaine 1 %  Outcome:  Tolerated well, no immediate complications  Procedure discussed: discussed risks, benefits, and alternatives    Consent Given by:  Patient  Timeout: timeout called immediately prior to procedure    Prep: patient was prepped and draped in usual sterile fashion     Ultrasound was used to ensure safe and accurate needle placement and injection. Ultrasound images of the procedure were permanently stored.          Albert Yeo MD [facility]      Again, thank you for allowing me to participate in the care of your patient.        Sincerely,        Albert Yeo, MD

## 2024-06-07 NOTE — PATIENT INSTRUCTIONS
1. Trigger thumb of left hand    2. Trigger middle finger of left hand    3. Trigger ring finger of left hand      -Patient has chronic left hand pain due to thumb third and fourth digit due to trigger fingers  -Patient has longstanding history of multiple trigger fingers, surgeries and injections.  Patient had a left thumb A1 pulley cortisone injection approximately 3 months ago which provided only 2 months of relief  -Patient was requesting a repeat thumb A1 pulley cortisone injection today.  Patient was advised that he is not getting long-term treatment and should consider surgical intervention in the near future  -Patient is also having clicking and pain at the IP joint.  -Patient tolerated left thumb A1 and A2 pulley cortisone injections today without complications.  Patient was given postprocedure instructions  -Patient will be seen by hand therapy to mold custom finger splints for the thumb third and fourth digits  -Patient will call us if pain returns within a few months for referral to hand surgery  -Call direct clinic number [[phone]] at any time with questions or concerns.    Albert Yeo MD [facility]  [facility] [place]

## 2024-06-10 ENCOUNTER — TELEPHONE (OUTPATIENT)
Dept: NEUROSURGERY | Facility: CLINIC | Age: 54
End: 2024-06-10
Payer: COMMERCIAL

## 2024-06-10 ENCOUNTER — OFFICE VISIT (OUTPATIENT)
Dept: NEUROSURGERY | Facility: CLINIC | Age: 54
End: 2024-06-10
Payer: OTHER MISCELLANEOUS

## 2024-06-10 ENCOUNTER — PRE VISIT (OUTPATIENT)
Dept: NEUROSURGERY | Facility: CLINIC | Age: 54
End: 2024-06-10
Payer: COMMERCIAL

## 2024-06-10 VITALS — SYSTOLIC BLOOD PRESSURE: 149 MMHG | DIASTOLIC BLOOD PRESSURE: 85 MMHG | OXYGEN SATURATION: 97 % | HEART RATE: 81 BPM

## 2024-06-10 DIAGNOSIS — M54.50 ACUTE MIDLINE LOW BACK PAIN WITHOUT SCIATICA: Primary | ICD-10-CM

## 2024-06-10 PROCEDURE — 99203 OFFICE O/P NEW LOW 30 MIN: CPT | Performed by: PHYSICIAN ASSISTANT

## 2024-06-10 RX ORDER — METHYLPREDNISOLONE 4 MG
TABLET, DOSE PACK ORAL
Qty: 21 TABLET | Refills: 0 | Status: SHIPPED | OUTPATIENT
Start: 2024-06-10

## 2024-06-10 ASSESSMENT — PATIENT HEALTH QUESTIONNAIRE - PHQ9
SUM OF ALL RESPONSES TO PHQ QUESTIONS 1-9: 2
SUM OF ALL RESPONSES TO PHQ QUESTIONS 1-9: 2
10. IF YOU CHECKED OFF ANY PROBLEMS, HOW DIFFICULT HAVE THESE PROBLEMS MADE IT FOR YOU TO DO YOUR WORK, TAKE CARE OF THINGS AT HOME, OR GET ALONG WITH OTHER PEOPLE: NOT DIFFICULT AT ALL

## 2024-06-10 ASSESSMENT — PAIN SCALES - GENERAL: PAINLEVEL: EXTREME PAIN (8)

## 2024-06-10 NOTE — LETTER
6/10/2024      Ry Escobar  [address] [place] [address]      Dear Colleague,    Thank you for referring your patient, Ry Escobar, to the [facility]. Please see a copy of my visit note below.    Neurosurgery Consult    HPI    Mr. Escobar is a 54-year-old male with a history of L5-S1 microdiscectomy performed in 2019, with [facility] orthopedics, spinal cord stimulator placement by [facility] pain in [place].    Patient states this morning he was at his job he bent down to lift something, and had sudden onset of pain in his back.  He denies radicular symptoms.    He took his pain medications which are prescribed to him by his pain clinic, muscle relaxants, and opioids, which did not help.    He denies any radicular symptoms, denies bowel or bladder symptoms, denies weakness in his lower extremities.    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      B/P: 149/85, T: Data Unavailable, P: 81, R: Data Unavailable       Exam    Alert and oriented no acute distress  Bilateral lower extremities with 5/5 strength  Reflexes 2+ patella  Negative straight leg raise bilaterally  Negative ankle clonus negative Babinski bilaterally  Lumbar spine nontender to palpation  Gait is normal    Imaging    No imaging to review    Assessment    Acute onset back pain, without radiculopathy    Status post lumbar surgery 2019  has an active spinal cord stimulator    Plan:      Recommend the patient attempt a Medrol Dosepak.  He is not interested in any physical therapy at this time.  Pain medications will continue to be prescribed by his pain clinic.  He can follow-up with us in 3 to 6 weeks if his pain is persisting, or if he is developing any worsening symptoms or numbness or weakness he should certainly contact us  sooner.          Again, thank you for allowing me to participate in the care of your patient.        Sincerely,        Brittany Gloria PA-C

## 2024-06-10 NOTE — TELEPHONE ENCOUNTER
Form or Letter   Type or form/letter needing completion: work ability form  Provider: Brittany Gloria   Date form needed: ASAP  Once completed: Patient will  at .    Patient for got to get work ability form. Coming back in to get one    Could we send this information to you in [facility] or would you prefer to receive a phone call?:   Patient would prefer a phone call   Okay to leave a detailed message?: No at Cell number on file:    Telephone Information:   Mobile [phone]

## 2024-06-10 NOTE — PROGRESS NOTES
Neurosurgery Consult    HPI    Mr. Escobar is a 54-year-old male with a history of L5-S1 microdiscectomy performed in 2019, with [facility] orthopedics, spinal cord stimulator placement by [facility] pain in [place].    Patient states this morning he was at his job he bent down to lift something, and had sudden onset of pain in his back.  He denies radicular symptoms.    He took his pain medications which are prescribed to him by his pain clinic, muscle relaxants, and opioids, which did not help.    He denies any radicular symptoms, denies bowel or bladder symptoms, denies weakness in his lower extremities.    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      B/P: 149/85, T: Data Unavailable, P: 81, R: Data Unavailable       Exam    Alert and oriented no acute distress  Bilateral lower extremities with 5/5 strength  Reflexes 2+ patella  Negative straight leg raise bilaterally  Negative ankle clonus negative Babinski bilaterally  Lumbar spine nontender to palpation  Gait is normal    Imaging    No imaging to review    Assessment    Acute onset back pain, without radiculopathy    Status post lumbar surgery 2019  has an active spinal cord stimulator    Plan:      Recommend the patient attempt a Medrol Dosepak.  He is not interested in any physical therapy at this time.  Pain medications will continue to be prescribed by his pain clinic.  He can follow-up with us in 3 to 6 weeks if his pain is persisting, or if he is developing any worsening symptoms or numbness or weakness he should certainly contact us sooner.

## 2024-06-10 NOTE — TELEPHONE ENCOUNTER
SPINE PATIENTS - NEW PROTOCOL PREVISIT    RECORDS RECEIVED FROM: self ref/ pending wc   REASON FOR VISIT: low back right sided pain   PROVIDER: Brittany   DATE OF APPT: 06/10/2024   NOTES (FOR ALL VISITS) STATUS DETAILS   OFFICE NOTE from referring provider N/A    OFFICE NOTE from other specialist Internal PT  prev completed in 2015, nothing recent.   DISCHARGE SUMMARY from hospital N/A    DISCHARGE REPORT from ER N/A    OPERATIVE REPORT Care Everywhere Prev surg; MIS Decompression - Hemilaminotomy/discectomy L5 to S1 Left  02/15/2019   EMG REPORT Care Everywhere Prev EMG 2019 w/[facility]    MEDICATION LIST N/A    IMAGING  (FOR ALL VISITS)     MRI (HEAD, NECK, SPINE) N/A    XRAY (SPINE) *NEUROSURGERY* N/A    CT (HEAD, NECK, SPINE) N/A

## 2024-06-10 NOTE — NURSING NOTE
"Ry Escobar is a 54 year old male who presents for:  Chief Complaint   Patient presents with    Consult     low back right sided pain. Pain started today.         Initial Vitals:  BP (!) 149/85   Pulse 81   SpO2 97%  Estimated body mass index is 41.16 kg/m  as calculated from the following:    Height as of 6/7/24: 5' 6\" (1.676 m).    Weight as of 6/7/24: 255 lb (115.7 kg).. There is no height or weight on file to calculate BSA. BP completed using cuff size: large  Extreme Pain (8)    Nursing Comments:       Trisha Razo    "

## 2024-06-11 ENCOUNTER — TELEPHONE (OUTPATIENT)
Dept: NEUROSURGERY | Facility: CLINIC | Age: 54
End: 2024-06-11

## 2024-06-11 NOTE — TELEPHONE ENCOUNTER
Pt was seen on 6.10.24    Pt's work note states 6.11.24    Pt needs an updated work note, stating he was seen on 6.10.24    Please send the updated work note through [employer], per pt's request. Thank you.    Please CALL the pt with any questions, in regard. Thank you.

## 2024-06-11 NOTE — LETTER
Yesenia 10, 2024      Ry Escobar  [id number] [facility] [id number]        To Whom It May Concern:      Ry Escobar was seen in our clinic on 6/10/2024. He may return to work, light duty only for 3 weeks. Please contact our clinic with any questions or concerns.         Sincerely,        Brittany Gloria PA-C

## 2024-06-11 NOTE — TELEPHONE ENCOUNTER
We did not discuss it, but it certainly would be reasonable, he can ask him what he is thinking, I think something along the lines of 2 to 3 weeks of light duty would be appropriate.

## 2024-06-12 NOTE — TELEPHONE ENCOUNTER
Work letter updated to reflect date of appointment. Letter sent to patient via [employer]. Patient updated via [employer].

## 2025-06-14 ENCOUNTER — HEALTH MAINTENANCE LETTER (OUTPATIENT)
Age: 55
End: 2025-06-14